# Patient Record
Sex: FEMALE | Race: WHITE | NOT HISPANIC OR LATINO | Employment: OTHER | URBAN - METROPOLITAN AREA
[De-identification: names, ages, dates, MRNs, and addresses within clinical notes are randomized per-mention and may not be internally consistent; named-entity substitution may affect disease eponyms.]

---

## 2017-02-28 ENCOUNTER — HOSPITAL ENCOUNTER (OUTPATIENT)
Dept: FAMILY MEDICINE CLINIC | Facility: CLINIC | Age: 42
Discharge: HOME | End: 2017-02-28
Attending: NURSE PRACTITIONER | Admitting: NURSE PRACTITIONER

## 2018-04-23 ENCOUNTER — HOSPITAL ENCOUNTER (OUTPATIENT)
Dept: FAMILY MEDICINE CLINIC | Facility: CLINIC | Age: 43
Discharge: HOME | End: 2018-04-23
Attending: NURSE PRACTITIONER | Admitting: NURSE PRACTITIONER

## 2019-07-03 RX ORDER — ALBUTEROL SULFATE 90 UG/1
2 AEROSOL, METERED RESPIRATORY (INHALATION) EVERY 4 HOURS PRN
Qty: 1 INHALER | Refills: 2 | Status: SHIPPED | OUTPATIENT
Start: 2019-07-03 | End: 2020-03-17 | Stop reason: SDUPTHER

## 2019-07-03 RX ORDER — ALBUTEROL SULFATE 90 UG/1
2 AEROSOL, METERED RESPIRATORY (INHALATION) EVERY 4 HOURS PRN
COMMUNITY
Start: 2016-11-04 | End: 2019-07-03 | Stop reason: SDUPTHER

## 2019-08-07 RX ORDER — LEVOTHYROXINE SODIUM 0.07 MG/1
TABLET ORAL
Qty: 30 TABLET | Refills: 3 | Status: SHIPPED | OUTPATIENT
Start: 2019-08-07 | End: 2019-11-29 | Stop reason: SDUPTHER

## 2019-09-26 ENCOUNTER — OFFICE VISIT (OUTPATIENT)
Dept: FAMILY MEDICINE CLINIC | Facility: CLINIC | Age: 44
End: 2019-09-26

## 2019-09-26 VITALS
TEMPERATURE: 98.3 F | WEIGHT: 115 LBS | HEART RATE: 75 BPM | BODY MASS INDEX: 23.18 KG/M2 | HEIGHT: 59 IN | DIASTOLIC BLOOD PRESSURE: 89 MMHG | SYSTOLIC BLOOD PRESSURE: 137 MMHG | OXYGEN SATURATION: 98 %

## 2019-09-26 DIAGNOSIS — E03.9 HYPOTHYROIDISM, UNSPECIFIED TYPE: Primary | ICD-10-CM

## 2019-09-26 DIAGNOSIS — M54.2 NECK PAIN: ICD-10-CM

## 2019-09-26 DIAGNOSIS — Z12.39 BREAST CANCER SCREENING: ICD-10-CM

## 2019-09-26 DIAGNOSIS — Z00.00 PREVENTATIVE HEALTH CARE: ICD-10-CM

## 2019-09-26 DIAGNOSIS — M54.12 CERVICAL RADICULOPATHY: ICD-10-CM

## 2019-09-26 PROBLEM — R76.8 ELEVATED ANTINUCLEAR ANTIBODY (ANA) LEVEL: Status: ACTIVE | Noted: 2017-05-18

## 2019-09-26 PROBLEM — M13.80 SERONEGATIVE ARTHRITIS: Status: ACTIVE | Noted: 2018-01-02

## 2019-09-26 PROBLEM — F32.A DEPRESSION: Status: ACTIVE | Noted: 2017-09-13

## 2019-09-26 PROBLEM — M54.9 BACK PAIN: Status: ACTIVE | Noted: 2018-04-23

## 2019-09-26 PROBLEM — M79.7 FIBROMYALGIA: Status: ACTIVE | Noted: 2018-01-02

## 2019-09-26 PROBLEM — F17.200 TOBACCO DEPENDENCE SYNDROME: Status: ACTIVE | Noted: 2017-02-28

## 2019-09-26 PROBLEM — J30.1 ALLERGIC RHINITIS DUE TO POLLEN: Status: ACTIVE | Noted: 2019-04-03

## 2019-09-26 PROCEDURE — 99213 OFFICE O/P EST LOW 20 MIN: CPT | Performed by: NURSE PRACTITIONER

## 2019-09-26 RX ORDER — METHYLPREDNISOLONE 4 MG/1
TABLET ORAL
Qty: 1 EACH | Refills: 0 | Status: SHIPPED | OUTPATIENT
Start: 2019-09-26 | End: 2020-03-17

## 2019-09-26 RX ORDER — NAPROXEN SODIUM 220 MG
220 TABLET ORAL 2 TIMES DAILY PRN
Qty: 20 TABLET | Refills: 0 | Status: SHIPPED | OUTPATIENT
Start: 2019-09-26 | End: 2020-03-17

## 2019-09-26 RX ORDER — BACLOFEN 10 MG/1
10 TABLET ORAL 3 TIMES DAILY
Qty: 30 TABLET | Refills: 0 | Status: SHIPPED | OUTPATIENT
Start: 2019-09-26 | End: 2020-09-15

## 2019-09-26 NOTE — PATIENT INSTRUCTIONS
Take medications as directed   physical therapy as directed   keep appointment for mammogram   keep all follow-up appointment with Podiatry

## 2019-09-26 NOTE — PROGRESS NOTES
Subjective   Michelle Sandhu is a 44 y.o. female.     43-year-old white female smoker with history of noncompliance with psychiatric issues, chronic back pain, depression, hypothyroidism, GERD, headaches, myalgias who comes in today with complaints of neck pain that radiates down to her back and down her left arm that started about a week ago.  She does have a history of stenosis at C6 and C7 with some bone spurring.  Placing her on medications and her therapy at that does not work will be given Ortho consult  Patient has seen podiatry since her last visit he did surgery on the foot with screw and she states he feels better but she still has some pain  Patient up-to-date on eye exams by me is good her mammogram  Blood pressure 136/88 heart rate 74 she denies any chest pain, dyspnea, tachycardia, dizziness or edema     physical therapy 3 x 2   mammogram   Medrol Dosepak/ baclofen 10 mg three times a day/ Aleve twice a day         The following portions of the patient's history were reviewed and updated as appropriate: allergies, current medications, past family history, past medical history, past social history, past surgical history and problem list.    Review of Systems   Constitutional: Negative.    HENT: Negative.    Respiratory: Negative.    Cardiovascular: Negative.    Gastrointestinal: Negative.    Genitourinary: Negative.    Musculoskeletal: Positive for neck pain.   Neurological: Negative.    Psychiatric/Behavioral: Negative.        Objective   Physical Exam   Constitutional: She is oriented to person, place, and time. She appears well-developed and well-nourished.   Cardiovascular: Normal rate.   Pulmonary/Chest: Effort normal and breath sounds normal.   Abdominal: Soft.   Musculoskeletal:    neck pain with what of clumpy in to left shoulder and arm   Neurological: She is alert and oriented to person, place, and time.   Skin: Skin is warm and dry.   Psychiatric: She has a normal mood and affect.          Assessment/Plan   Problems Addressed this Visit        Endocrine    Hypothyroidism - Primary    Relevant Medications    methylPREDNISolone (MEDROL, ANDREY,) 4 MG tablet    Other Relevant Orders    T3    TSH+Free T4       Nervous and Auditory    Cervical radiculopathy    Relevant Orders    Ambulatory Referral to Physical Therapy Evaluate and treat (Completed)    Neck pain    Relevant Orders    Ambulatory Referral to Physical Therapy Evaluate and treat (Completed)      Other Visit Diagnoses     Preventative health care        Relevant Orders    CBC (No Diff)    Comprehensive Metabolic Panel    Lipid Panel With LDL / HDL Ratio    Breast cancer screening        Relevant Orders    Mammo Screening Digital Tomosynthesis Bilateral With CAD

## 2019-09-28 LAB
ALBUMIN SERPL-MCNC: 4.3 G/DL (ref 3.5–5.5)
ALBUMIN/GLOB SERPL: 2 {RATIO} (ref 1.2–2.2)
ALP SERPL-CCNC: 56 IU/L (ref 39–117)
ALT SERPL-CCNC: 13 IU/L (ref 0–32)
AST SERPL-CCNC: 15 IU/L (ref 0–40)
BILIRUB SERPL-MCNC: 0.3 MG/DL (ref 0–1.2)
BUN SERPL-MCNC: 13 MG/DL (ref 6–24)
BUN/CREAT SERPL: 20 (ref 9–23)
CALCIUM SERPL-MCNC: 9.6 MG/DL (ref 8.7–10.2)
CHLORIDE SERPL-SCNC: 106 MMOL/L (ref 96–106)
CHOLEST SERPL-MCNC: 209 MG/DL (ref 100–199)
CO2 SERPL-SCNC: 24 MMOL/L (ref 20–29)
CREAT SERPL-MCNC: 0.64 MG/DL (ref 0.57–1)
ERYTHROCYTE [DISTWIDTH] IN BLOOD BY AUTOMATED COUNT: 13.3 % (ref 12.3–15.4)
GLOBULIN SER CALC-MCNC: 2.2 G/DL (ref 1.5–4.5)
GLUCOSE SERPL-MCNC: 87 MG/DL (ref 65–99)
HCT VFR BLD AUTO: 44.1 % (ref 34–46.6)
HDLC SERPL-MCNC: 52 MG/DL
HGB BLD-MCNC: 14.6 G/DL (ref 11.1–15.9)
LDLC SERPL CALC-MCNC: 141 MG/DL (ref 0–99)
LDLC/HDLC SERPL: 2.7 RATIO (ref 0–3.2)
MCH RBC QN AUTO: 33.3 PG (ref 26.6–33)
MCHC RBC AUTO-ENTMCNC: 33.1 G/DL (ref 31.5–35.7)
MCV RBC AUTO: 101 FL (ref 79–97)
PLATELET # BLD AUTO: 279 X10E3/UL (ref 150–450)
POTASSIUM SERPL-SCNC: 4.9 MMOL/L (ref 3.5–5.2)
PROT SERPL-MCNC: 6.5 G/DL (ref 6–8.5)
RBC # BLD AUTO: 4.39 X10E6/UL (ref 3.77–5.28)
SODIUM SERPL-SCNC: 143 MMOL/L (ref 134–144)
T3 SERPL-MCNC: 93 NG/DL (ref 71–180)
T4 FREE SERPL-MCNC: 1.35 NG/DL (ref 0.82–1.77)
TRIGL SERPL-MCNC: 78 MG/DL (ref 0–149)
TSH SERPL DL<=0.005 MIU/L-ACNC: 1.74 UIU/ML (ref 0.45–4.5)
VLDLC SERPL CALC-MCNC: 16 MG/DL (ref 5–40)
WBC # BLD AUTO: 8.1 X10E3/UL (ref 3.4–10.8)

## 2019-10-15 DIAGNOSIS — Z12.39 BREAST CANCER SCREENING: ICD-10-CM

## 2019-10-17 ENCOUNTER — TELEPHONE (OUTPATIENT)
Dept: FAMILY MEDICINE CLINIC | Facility: CLINIC | Age: 44
End: 2019-10-17

## 2019-10-17 DIAGNOSIS — M54.2 CERVICAL PAIN (NECK): ICD-10-CM

## 2019-10-17 DIAGNOSIS — M54.6 PAIN IN THORACIC SPINE: Primary | ICD-10-CM

## 2019-10-24 DIAGNOSIS — M54.2 CERVICAL PAIN (NECK): Primary | ICD-10-CM

## 2019-11-21 ENCOUNTER — OFFICE VISIT (OUTPATIENT)
Dept: NEUROSURGERY | Facility: CLINIC | Age: 44
End: 2019-11-21

## 2019-11-21 VITALS
BODY MASS INDEX: 23.16 KG/M2 | DIASTOLIC BLOOD PRESSURE: 79 MMHG | RESPIRATION RATE: 18 BRPM | HEIGHT: 60 IN | WEIGHT: 118 LBS | HEART RATE: 75 BPM | OXYGEN SATURATION: 99 % | SYSTOLIC BLOOD PRESSURE: 123 MMHG

## 2019-11-21 DIAGNOSIS — M54.50 CHRONIC BILATERAL LOW BACK PAIN WITHOUT SCIATICA: ICD-10-CM

## 2019-11-21 DIAGNOSIS — G62.9 NEUROPATHY: ICD-10-CM

## 2019-11-21 DIAGNOSIS — G89.29 CHRONIC BILATERAL LOW BACK PAIN WITHOUT SCIATICA: ICD-10-CM

## 2019-11-21 DIAGNOSIS — M13.80 SERONEGATIVE ARTHRITIS: Primary | ICD-10-CM

## 2019-11-21 PROCEDURE — 99203 OFFICE O/P NEW LOW 30 MIN: CPT | Performed by: NEUROLOGICAL SURGERY

## 2019-11-25 ENCOUNTER — TELEPHONE (OUTPATIENT)
Dept: NEUROSURGERY | Facility: CLINIC | Age: 44
End: 2019-11-25

## 2019-11-25 DIAGNOSIS — G89.29 CHRONIC BILATERAL LOW BACK PAIN, UNSPECIFIED WHETHER SCIATICA PRESENT: Primary | ICD-10-CM

## 2019-11-25 DIAGNOSIS — M54.50 CHRONIC BILATERAL LOW BACK PAIN, UNSPECIFIED WHETHER SCIATICA PRESENT: Primary | ICD-10-CM

## 2019-11-25 NOTE — TELEPHONE ENCOUNTER
Voicemail from patient stating she was to have had scripts sent to pharmacy has not received them yet.

## 2019-11-27 RX ORDER — MELOXICAM 15 MG/1
15 TABLET ORAL DAILY
Qty: 30 TABLET | Refills: 2 | OUTPATIENT
Start: 2019-11-27 | End: 2020-03-17

## 2019-12-02 RX ORDER — LEVOTHYROXINE SODIUM 0.07 MG/1
TABLET ORAL
Qty: 30 TABLET | Refills: 3 | Status: SHIPPED | OUTPATIENT
Start: 2019-12-02 | End: 2020-04-20

## 2020-03-17 ENCOUNTER — OFFICE VISIT (OUTPATIENT)
Dept: FAMILY MEDICINE CLINIC | Facility: CLINIC | Age: 45
End: 2020-03-17

## 2020-03-17 VITALS
DIASTOLIC BLOOD PRESSURE: 87 MMHG | OXYGEN SATURATION: 97 % | TEMPERATURE: 98.5 F | HEART RATE: 80 BPM | WEIGHT: 121.8 LBS | SYSTOLIC BLOOD PRESSURE: 125 MMHG | BODY MASS INDEX: 24.56 KG/M2 | HEIGHT: 59 IN

## 2020-03-17 DIAGNOSIS — J30.1 ALLERGIC RHINITIS DUE TO POLLEN, UNSPECIFIED SEASONALITY: Primary | ICD-10-CM

## 2020-03-17 DIAGNOSIS — F17.200 TOBACCO DEPENDENCE SYNDROME: ICD-10-CM

## 2020-03-17 PROCEDURE — 99213 OFFICE O/P EST LOW 20 MIN: CPT | Performed by: NURSE PRACTITIONER

## 2020-03-17 RX ORDER — CETIRIZINE HYDROCHLORIDE 10 MG/1
10 TABLET ORAL DAILY
Qty: 30 TABLET | Refills: 2 | Status: SHIPPED | OUTPATIENT
Start: 2020-03-17 | End: 2020-09-15

## 2020-03-17 RX ORDER — FLUTICASONE PROPIONATE 50 MCG
2 SPRAY, SUSPENSION (ML) NASAL DAILY
Qty: 1 BOTTLE | Refills: 6 | Status: SHIPPED | OUTPATIENT
Start: 2020-03-17 | End: 2020-09-28 | Stop reason: SDUPTHER

## 2020-03-17 RX ORDER — PSEUDOEPHEDRINE HCL 30 MG
30 TABLET ORAL EVERY 4 HOURS PRN
Qty: 60 TABLET | Refills: 2 | Status: SHIPPED | OUTPATIENT
Start: 2020-03-17 | End: 2020-09-15

## 2020-03-17 RX ORDER — ALBUTEROL SULFATE 90 UG/1
2 AEROSOL, METERED RESPIRATORY (INHALATION) EVERY 4 HOURS PRN
Qty: 1 INHALER | Refills: 5 | Status: SHIPPED | OUTPATIENT
Start: 2020-03-17 | End: 2020-12-04 | Stop reason: SDUPTHER

## 2020-03-17 NOTE — PROGRESS NOTES
"    Michelle Sandhu is a 45 y.o. female.     45-year-old white female smoker with history of noncompliance with psychiatric issues, chronic back pain, depression, hypothyroidism, GERD, headaches, and myalgias who comes in today for follow-up visit.  Patient is needing refill on all medications and states she is doing well with the exception of allergic rhinitis symptoms and congestion at night making it hard to sleep.  Exam reveals allergic rhinitis lungs are clear but diminished on placing her on allergy medication she is to call me if condition does not improve or worsens  Blood pressure 124/86 heart rate 82 denies any chest pain, dyspnea, tachycardia or dizziness  Weight is up 7 pounds at 122 but patient is not overweight patient coming in next visit fasting    Zyrtec 10 mg a.m./Flonase nasal spray/Sudafed 30 4 times daily/Benadryl 25 nightly  Fasting blood work next visit       The following portions of the patient's history were reviewed and updated as appropriate: allergies, current medications, past family history, past medical history, past social history, past surgical history and problem list.    Vitals:    03/17/20 1035   BP: 125/87   BP Location: Right arm   Patient Position: Sitting   Cuff Size: Adult   Pulse: 80   Temp: 98.5 °F (36.9 °C)   TempSrc: Oral   SpO2: 97%   Weight: 55.2 kg (121 lb 12.8 oz)   Height: 149.9 cm (59\")     Body mass index is 24.6 kg/m².    Past Medical History:   Diagnosis Date   • Fibromyalgia, primary    • Hypertension    • Hypothyroidism    • Rheumatoid arthritis (CMS/HCC)      Past Surgical History:   Procedure Laterality Date   • EYE SURGERY     • EYE SURGERY     • FOOT SURGERY     • INNER EAR SURGERY     • OVARY SURGERY     • SUBTOTAL HYSTERECTOMY     • TUMOR REMOVAL       Family History   Problem Relation Age of Onset   • Hypertension Mother    • COPD Mother    • Heart disease Mother    • Hyperlipidemia Mother    • Diabetes Maternal Uncle    • Cancer Maternal Uncle    • " Cancer Maternal Grandfather        There is no immunization history on file for this patient.    Office Visit on 09/26/2019   Component Date Value Ref Range Status   • WBC 09/27/2019 8.1  3.4 - 10.8 x10E3/uL Final   • RBC 09/27/2019 4.39  3.77 - 5.28 x10E6/uL Final   • Hemoglobin 09/27/2019 14.6  11.1 - 15.9 g/dL Final   • Hematocrit 09/27/2019 44.1  34.0 - 46.6 % Final   • MCV 09/27/2019 101* 79 - 97 fL Final   • MCH 09/27/2019 33.3* 26.6 - 33.0 pg Final   • MCHC 09/27/2019 33.1  31.5 - 35.7 g/dL Final   • RDW 09/27/2019 13.3  12.3 - 15.4 % Final   • Platelets 09/27/2019 279  150 - 450 x10E3/uL Final   • Glucose 09/27/2019 87  65 - 99 mg/dL Final   • BUN 09/27/2019 13  6 - 24 mg/dL Final   • Creatinine 09/27/2019 0.64  0.57 - 1.00 mg/dL Final   • eGFR Non African Am 09/27/2019 110  >59 mL/min/1.73 Final   • eGFR African Am 09/27/2019 126  >59 mL/min/1.73 Final   • BUN/Creatinine Ratio 09/27/2019 20  9 - 23 Final   • Sodium 09/27/2019 143  134 - 144 mmol/L Final   • Potassium 09/27/2019 4.9  3.5 - 5.2 mmol/L Final   • Chloride 09/27/2019 106  96 - 106 mmol/L Final   • Total CO2 09/27/2019 24  20 - 29 mmol/L Final   • Calcium 09/27/2019 9.6  8.7 - 10.2 mg/dL Final   • Total Protein 09/27/2019 6.5  6.0 - 8.5 g/dL Final   • Albumin 09/27/2019 4.3  3.5 - 5.5 g/dL Final   • Globulin 09/27/2019 2.2  1.5 - 4.5 g/dL Final   • A/G Ratio 09/27/2019 2.0  1.2 - 2.2 Final   • Total Bilirubin 09/27/2019 0.3  0.0 - 1.2 mg/dL Final   • Alkaline Phosphatase 09/27/2019 56  39 - 117 IU/L Final   • AST (SGOT) 09/27/2019 15  0 - 40 IU/L Final   • ALT (SGPT) 09/27/2019 13  0 - 32 IU/L Final   • T3, Total 09/27/2019 93  71 - 180 ng/dL Final   • TSH 09/27/2019 1.740  0.450 - 4.500 uIU/mL Final   • Free T4 09/27/2019 1.35  0.82 - 1.77 ng/dL Final   • Total Cholesterol 09/27/2019 209* 100 - 199 mg/dL Final   • Triglycerides 09/27/2019 78  0 - 149 mg/dL Final   • HDL Cholesterol 09/27/2019 52  >39 mg/dL Final   • VLDL Cholesterol 09/27/2019  16  5 - 40 mg/dL Final   • LDL Cholesterol  09/27/2019 141* 0 - 99 mg/dL Final   • LDL/HDL Ratio 09/27/2019 2.7  0.0 - 3.2 ratio Final    Comment:                                     LDL/HDL Ratio                                              Men  Women                                1/2 Avg.Risk  1.0    1.5                                    Avg.Risk  3.6    3.2                                 2X Avg.Risk  6.2    5.0                                 3X Avg.Risk  8.0    6.1           Review of Systems   Constitutional: Negative.    HENT: Positive for postnasal drip, rhinorrhea, sinus pressure, sore throat and voice change.    Respiratory: Positive for cough.    Cardiovascular: Negative.    Gastrointestinal: Negative.    Genitourinary: Negative.    Musculoskeletal: Negative.    Skin: Negative.    Neurological: Negative.    Psychiatric/Behavioral: Negative.        Objective   Physical Exam   Constitutional: She is oriented to person, place, and time. She appears well-developed and well-nourished.   Cardiovascular: Normal rate and regular rhythm.   Pulmonary/Chest: Effort normal.   Lungs diminished   Abdominal: Soft. Bowel sounds are normal.   Musculoskeletal: Normal range of motion.   Neurological: She is alert and oriented to person, place, and time.   Skin: Skin is warm and dry.   Psychiatric: She has a normal mood and affect.       Procedures    Assessment/Plan   Michelle was seen today for allergies.    Diagnoses and all orders for this visit:    Allergic rhinitis due to pollen, unspecified seasonality    Tobacco dependence syndrome    Other orders  -     albuterol sulfate HFA (ProAir HFA) 108 (90 Base) MCG/ACT inhaler; Inhale 2 puffs Every 4 (Four) Hours As Needed for Shortness of Air.  -     fluticasone (FLONASE) 50 MCG/ACT nasal spray; 2 sprays into the nostril(s) as directed by provider Daily.  -     cetirizine (ZyrTEC Allergy) 10 MG tablet; Take 1 tablet by mouth Daily.  -     pseudoephedrine (Sudafed) 30 MG tablet;  Take 1 tablet by mouth Every 4 (Four) Hours As Needed for Congestion.          Current Outpatient Medications:   •  albuterol sulfate HFA (ProAir HFA) 108 (90 Base) MCG/ACT inhaler, Inhale 2 puffs Every 4 (Four) Hours As Needed for Shortness of Air., Disp: 1 inhaler, Rfl: 5  •  levothyroxine (SYNTHROID, LEVOTHROID) 75 MCG tablet, take 1 tablet by mouth once daily ON AN EMPTY STOMACH, Disp: 30 tablet, Rfl: 3  •  baclofen (LIORESAL) 10 MG tablet, Take 1 tablet by mouth 3 (Three) Times a Day., Disp: 30 tablet, Rfl: 0  •  cetirizine (ZyrTEC Allergy) 10 MG tablet, Take 1 tablet by mouth Daily., Disp: 30 tablet, Rfl: 2  •  fluticasone (FLONASE) 50 MCG/ACT nasal spray, 2 sprays into the nostril(s) as directed by provider Daily., Disp: 1 bottle, Rfl: 6  •  pseudoephedrine (Sudafed) 30 MG tablet, Take 1 tablet by mouth Every 4 (Four) Hours As Needed for Congestion., Disp: 60 tablet, Rfl: 2

## 2020-03-17 NOTE — PATIENT INSTRUCTIONS
Take allergy medication as directed  Fasting blood work next visit  Stop smoking , consider smoking cessation

## 2020-04-20 RX ORDER — LEVOTHYROXINE SODIUM 0.07 MG/1
TABLET ORAL
Qty: 30 TABLET | Refills: 3 | Status: SHIPPED | OUTPATIENT
Start: 2020-04-20 | End: 2020-08-06

## 2020-07-14 ENCOUNTER — TELEPHONE (OUTPATIENT)
Dept: FAMILY MEDICINE CLINIC | Facility: CLINIC | Age: 45
End: 2020-07-14

## 2020-07-14 NOTE — TELEPHONE ENCOUNTER
PATIENT HAS RECEIVED NICOTINE GUM AND WANTS TO MAKE  SURE THAT IT IS SAFE FOR HER TO USE WITH HER MEDICATIONS

## 2020-07-23 RX ORDER — ALBUTEROL SULFATE 90 UG/1
AEROSOL, METERED RESPIRATORY (INHALATION)
Qty: 8.5 G | OUTPATIENT
Start: 2020-07-23

## 2020-08-06 RX ORDER — LEVOTHYROXINE SODIUM 0.07 MG/1
TABLET ORAL
Qty: 30 TABLET | Refills: 3 | Status: SHIPPED | OUTPATIENT
Start: 2020-08-06 | End: 2020-12-01 | Stop reason: SDUPTHER

## 2020-09-15 ENCOUNTER — OFFICE VISIT (OUTPATIENT)
Dept: FAMILY MEDICINE CLINIC | Facility: CLINIC | Age: 45
End: 2020-09-15

## 2020-09-15 VITALS
WEIGHT: 120 LBS | DIASTOLIC BLOOD PRESSURE: 87 MMHG | TEMPERATURE: 97.1 F | BODY MASS INDEX: 24.19 KG/M2 | SYSTOLIC BLOOD PRESSURE: 143 MMHG | HEART RATE: 78 BPM | OXYGEN SATURATION: 100 % | HEIGHT: 59 IN

## 2020-09-15 DIAGNOSIS — Z00.00 PREVENTATIVE HEALTH CARE: ICD-10-CM

## 2020-09-15 DIAGNOSIS — M79.675 CHRONIC TOE PAIN, LEFT FOOT: ICD-10-CM

## 2020-09-15 DIAGNOSIS — I10 BENIGN ESSENTIAL HYPERTENSION: ICD-10-CM

## 2020-09-15 DIAGNOSIS — F17.200 TOBACCO DEPENDENCE SYNDROME: ICD-10-CM

## 2020-09-15 DIAGNOSIS — G89.29 CHRONIC TOE PAIN, LEFT FOOT: ICD-10-CM

## 2020-09-15 DIAGNOSIS — E03.9 HYPOTHYROIDISM, UNSPECIFIED TYPE: Primary | ICD-10-CM

## 2020-09-15 PROCEDURE — 99213 OFFICE O/P EST LOW 20 MIN: CPT | Performed by: NURSE PRACTITIONER

## 2020-09-15 PROCEDURE — G0439 PPPS, SUBSEQ VISIT: HCPCS | Performed by: NURSE PRACTITIONER

## 2020-09-15 RX ORDER — AMLODIPINE BESYLATE 2.5 MG/1
2.5 TABLET ORAL
Qty: 30 TABLET | Refills: 2 | Status: SHIPPED | OUTPATIENT
Start: 2020-09-15 | End: 2020-12-10 | Stop reason: SDUPTHER

## 2020-09-15 NOTE — PATIENT INSTRUCTIONS
Schedule fasting blood work  Keep appointment with podiatry  Purchase nebulizer for COPD  Stop smoking , consider smoking cessation

## 2020-09-15 NOTE — PROGRESS NOTES
"    Michelle Sandhu is a 45 y.o. female.     45-year-old white female smoker with history of noncompliance with psychiatric issues, chronic back pain, depression, hypothyroidism, GERD, headaches, myalgias, and recent surgery on left little toe tendon with podiatry who comes in today for wellness visit  Blood pressure 148/86 heart rate 78 she denies any chest pain, dyspnea, tachycardia or dizziness  Her weight is stable at 120 with a BMI of 24.2  Patient is up-to-date on eye exams Pap smears and mammograms.  She refuses influenza vaccine  We discussed smoking cessation patient not interested at this time.  I did  recommend she get a nebulizer that would help her better with her breathing during the hot weather.  Patient had recent surgery on her left little toe tendon to straighten the toe out however she states is still having a lot of pain I am sending her to a different podiatrist for second opinion.    Fasting blood work  Reconsider flu vaccination  Pickup nebulizer  Podiatry referral         The following portions of the patient's history were reviewed and updated as appropriate: allergies, current medications, past family history, past medical history, past social history, past surgical history and problem list.    Vitals:    09/15/20 1444   BP: 143/87   BP Location: Right arm   Patient Position: Sitting   Cuff Size: Adult   Pulse: 78   Temp: 97.1 °F (36.2 °C)   TempSrc: Temporal   SpO2: 100%   Weight: 54.4 kg (120 lb)   Height: 149.9 cm (59\")     Body mass index is 24.24 kg/m².    Past Medical History:   Diagnosis Date   • Fibromyalgia, primary    • Hypertension    • Hypothyroidism    • Rheumatoid arthritis (CMS/HCC)      Past Surgical History:   Procedure Laterality Date   • EYE SURGERY     • EYE SURGERY     • FOOT SURGERY     • INNER EAR SURGERY     • OVARY SURGERY     • SUBTOTAL HYSTERECTOMY     • TUMOR REMOVAL       Family History   Problem Relation Age of Onset   • Hypertension Mother    • COPD Mother    • " Heart disease Mother    • Hyperlipidemia Mother    • Diabetes Maternal Uncle    • Cancer Maternal Uncle    • Cancer Maternal Grandfather        There is no immunization history on file for this patient.    Office Visit on 09/26/2019   Component Date Value Ref Range Status   • WBC 09/27/2019 8.1  3.4 - 10.8 x10E3/uL Final   • RBC 09/27/2019 4.39  3.77 - 5.28 x10E6/uL Final   • Hemoglobin 09/27/2019 14.6  11.1 - 15.9 g/dL Final   • Hematocrit 09/27/2019 44.1  34.0 - 46.6 % Final   • MCV 09/27/2019 101* 79 - 97 fL Final   • MCH 09/27/2019 33.3* 26.6 - 33.0 pg Final   • MCHC 09/27/2019 33.1  31.5 - 35.7 g/dL Final   • RDW 09/27/2019 13.3  12.3 - 15.4 % Final   • Platelets 09/27/2019 279  150 - 450 x10E3/uL Final   • Glucose 09/27/2019 87  65 - 99 mg/dL Final   • BUN 09/27/2019 13  6 - 24 mg/dL Final   • Creatinine 09/27/2019 0.64  0.57 - 1.00 mg/dL Final   • eGFR Non African Am 09/27/2019 110  >59 mL/min/1.73 Final   • eGFR African Am 09/27/2019 126  >59 mL/min/1.73 Final   • BUN/Creatinine Ratio 09/27/2019 20  9 - 23 Final   • Sodium 09/27/2019 143  134 - 144 mmol/L Final   • Potassium 09/27/2019 4.9  3.5 - 5.2 mmol/L Final   • Chloride 09/27/2019 106  96 - 106 mmol/L Final   • Total CO2 09/27/2019 24  20 - 29 mmol/L Final   • Calcium 09/27/2019 9.6  8.7 - 10.2 mg/dL Final   • Total Protein 09/27/2019 6.5  6.0 - 8.5 g/dL Final   • Albumin 09/27/2019 4.3  3.5 - 5.5 g/dL Final   • Globulin 09/27/2019 2.2  1.5 - 4.5 g/dL Final   • A/G Ratio 09/27/2019 2.0  1.2 - 2.2 Final   • Total Bilirubin 09/27/2019 0.3  0.0 - 1.2 mg/dL Final   • Alkaline Phosphatase 09/27/2019 56  39 - 117 IU/L Final   • AST (SGOT) 09/27/2019 15  0 - 40 IU/L Final   • ALT (SGPT) 09/27/2019 13  0 - 32 IU/L Final   • T3, Total 09/27/2019 93  71 - 180 ng/dL Final   • TSH 09/27/2019 1.740  0.450 - 4.500 uIU/mL Final   • Free T4 09/27/2019 1.35  0.82 - 1.77 ng/dL Final   • Total Cholesterol 09/27/2019 209* 100 - 199 mg/dL Final   • Triglycerides 09/27/2019  78  0 - 149 mg/dL Final   • HDL Cholesterol 09/27/2019 52  >39 mg/dL Final   • VLDL Cholesterol 09/27/2019 16  5 - 40 mg/dL Final   • LDL Cholesterol  09/27/2019 141* 0 - 99 mg/dL Final   • LDL/HDL Ratio 09/27/2019 2.7  0.0 - 3.2 ratio Final    Comment:                                     LDL/HDL Ratio                                              Men  Women                                1/2 Avg.Risk  1.0    1.5                                    Avg.Risk  3.6    3.2                                 2X Avg.Risk  6.2    5.0                                 3X Avg.Risk  8.0    6.1           Review of Systems   HENT: Negative.    Respiratory: Negative.    Cardiovascular: Negative.    Gastrointestinal: Negative.    Genitourinary: Negative.    Musculoskeletal:        Left little toe pain   Skin: Negative.    Neurological: Negative.    Psychiatric/Behavioral: Negative.        Objective   Physical Exam  Constitutional:       Appearance: Normal appearance.   Neck:      Musculoskeletal: Normal range of motion.   Cardiovascular:      Rate and Rhythm: Normal rate and regular rhythm.      Pulses: Normal pulses.      Heart sounds: Normal heart sounds.   Pulmonary:      Effort: Pulmonary effort is normal.      Breath sounds: Normal breath sounds.   Abdominal:      General: Bowel sounds are normal.   Musculoskeletal: Normal range of motion.   Skin:     General: Skin is warm and dry.   Neurological:      General: No focal deficit present.      Mental Status: She is alert and oriented to person, place, and time.   Psychiatric:         Mood and Affect: Mood normal.         Behavior: Behavior normal.         Procedures    Assessment/Plan   Michelle was seen today for medicare wellness-subsequent.    Diagnoses and all orders for this visit:    Hypothyroidism, unspecified type  -     TSH+Free T4  -     T3    Preventative health care  -     CBC & Differential  -     Comprehensive Metabolic Panel  -     Lipid Panel With LDL / HDL Ratio    Benign  essential hypertension    Tobacco dependence syndrome    BMI 24.0-24.9, adult    Chronic toe pain, left foot    Other orders  -     amLODIPine (NORVASC) 2.5 MG tablet; Take 1 tablet by mouth every night at bedtime.          Current Outpatient Medications:   •  albuterol sulfate HFA (ProAir HFA) 108 (90 Base) MCG/ACT inhaler, Inhale 2 puffs Every 4 (Four) Hours As Needed for Shortness of Air., Disp: 1 inhaler, Rfl: 5  •  fluticasone (FLONASE) 50 MCG/ACT nasal spray, 2 sprays into the nostril(s) as directed by provider Daily., Disp: 1 bottle, Rfl: 6  •  levothyroxine (SYNTHROID, LEVOTHROID) 75 MCG tablet, TAKE 1 TABLET BY MOUTH EVERY DAY ON EMPTY STOMACH, Disp: 30 tablet, Rfl: 3  •  amLODIPine (NORVASC) 2.5 MG tablet, Take 1 tablet by mouth every night at bedtime., Disp: 30 tablet, Rfl: 2

## 2020-09-23 LAB
ALBUMIN SERPL-MCNC: 4.3 G/DL (ref 3.8–4.8)
ALBUMIN/GLOB SERPL: 1.8 {RATIO} (ref 1.2–2.2)
ALP SERPL-CCNC: 64 IU/L (ref 39–117)
ALT SERPL-CCNC: 13 IU/L (ref 0–32)
AST SERPL-CCNC: 18 IU/L (ref 0–40)
BASOPHILS # BLD AUTO: 0.1 X10E3/UL (ref 0–0.2)
BASOPHILS NFR BLD AUTO: 1 %
BILIRUB SERPL-MCNC: 0.6 MG/DL (ref 0–1.2)
BUN SERPL-MCNC: 9 MG/DL (ref 6–24)
BUN/CREAT SERPL: 11 (ref 9–23)
CALCIUM SERPL-MCNC: 9.8 MG/DL (ref 8.7–10.2)
CHLORIDE SERPL-SCNC: 106 MMOL/L (ref 96–106)
CHOLEST SERPL-MCNC: 205 MG/DL (ref 100–199)
CO2 SERPL-SCNC: 25 MMOL/L (ref 20–29)
CREAT SERPL-MCNC: 0.81 MG/DL (ref 0.57–1)
EOSINOPHIL # BLD AUTO: 0.9 X10E3/UL (ref 0–0.4)
EOSINOPHIL NFR BLD AUTO: 14 %
ERYTHROCYTE [DISTWIDTH] IN BLOOD BY AUTOMATED COUNT: 12.3 % (ref 11.7–15.4)
GLOBULIN SER CALC-MCNC: 2.4 G/DL (ref 1.5–4.5)
GLUCOSE SERPL-MCNC: 89 MG/DL (ref 65–99)
HCT VFR BLD AUTO: 45.4 % (ref 34–46.6)
HDLC SERPL-MCNC: 58 MG/DL
HGB BLD-MCNC: 15.2 G/DL (ref 11.1–15.9)
IMM GRANULOCYTES # BLD AUTO: 0 X10E3/UL (ref 0–0.1)
IMM GRANULOCYTES NFR BLD AUTO: 0 %
LDLC SERPL CALC-MCNC: 135 MG/DL (ref 0–99)
LDLC/HDLC SERPL: 2.3 RATIO (ref 0–3.2)
LYMPHOCYTES # BLD AUTO: 1.6 X10E3/UL (ref 0.7–3.1)
LYMPHOCYTES NFR BLD AUTO: 25 %
MCH RBC QN AUTO: 33.6 PG (ref 26.6–33)
MCHC RBC AUTO-ENTMCNC: 33.5 G/DL (ref 31.5–35.7)
MCV RBC AUTO: 100 FL (ref 79–97)
MONOCYTES # BLD AUTO: 0.6 X10E3/UL (ref 0.1–0.9)
MONOCYTES NFR BLD AUTO: 10 %
NEUTROPHILS # BLD AUTO: 3.2 X10E3/UL (ref 1.4–7)
NEUTROPHILS NFR BLD AUTO: 50 %
PLATELET # BLD AUTO: 269 X10E3/UL (ref 150–450)
POTASSIUM SERPL-SCNC: 5.1 MMOL/L (ref 3.5–5.2)
PROT SERPL-MCNC: 6.7 G/DL (ref 6–8.5)
RBC # BLD AUTO: 4.52 X10E6/UL (ref 3.77–5.28)
SODIUM SERPL-SCNC: 142 MMOL/L (ref 134–144)
T3 SERPL-MCNC: 90 NG/DL (ref 71–180)
T4 FREE SERPL-MCNC: 1.51 NG/DL (ref 0.82–1.77)
TRIGL SERPL-MCNC: 67 MG/DL (ref 0–149)
TSH SERPL DL<=0.005 MIU/L-ACNC: 1.5 UIU/ML (ref 0.45–4.5)
VLDLC SERPL CALC-MCNC: 12 MG/DL (ref 5–40)
WBC # BLD AUTO: 6.4 X10E3/UL (ref 3.4–10.8)

## 2020-09-28 RX ORDER — FLUTICASONE PROPIONATE 50 MCG
2 SPRAY, SUSPENSION (ML) NASAL DAILY
Qty: 1 BOTTLE | Refills: 6 | Status: SHIPPED | OUTPATIENT
Start: 2020-09-28 | End: 2020-11-16

## 2020-11-16 ENCOUNTER — OFFICE VISIT (OUTPATIENT)
Dept: FAMILY MEDICINE CLINIC | Facility: CLINIC | Age: 45
End: 2020-11-16

## 2020-11-16 VITALS
BODY MASS INDEX: 24.8 KG/M2 | WEIGHT: 123 LBS | HEIGHT: 59 IN | DIASTOLIC BLOOD PRESSURE: 92 MMHG | SYSTOLIC BLOOD PRESSURE: 150 MMHG | OXYGEN SATURATION: 97 % | HEART RATE: 83 BPM | TEMPERATURE: 98.9 F

## 2020-11-16 DIAGNOSIS — E03.9 HYPOTHYROIDISM, UNSPECIFIED TYPE: Primary | ICD-10-CM

## 2020-11-16 DIAGNOSIS — R07.9 CHEST PAIN, UNSPECIFIED TYPE: ICD-10-CM

## 2020-11-16 DIAGNOSIS — R00.2 PALPITATIONS: ICD-10-CM

## 2020-11-16 DIAGNOSIS — I10 BENIGN ESSENTIAL HYPERTENSION: ICD-10-CM

## 2020-11-16 DIAGNOSIS — F17.200 TOBACCO DEPENDENCE SYNDROME: ICD-10-CM

## 2020-11-16 PROCEDURE — 93000 ELECTROCARDIOGRAM COMPLETE: CPT | Performed by: NURSE PRACTITIONER

## 2020-11-16 PROCEDURE — 99214 OFFICE O/P EST MOD 30 MIN: CPT | Performed by: NURSE PRACTITIONER

## 2020-11-16 RX ORDER — METOPROLOL SUCCINATE 25 MG/1
25 TABLET, EXTENDED RELEASE ORAL DAILY
Qty: 30 TABLET | Refills: 2 | Status: SHIPPED | OUTPATIENT
Start: 2020-11-16 | End: 2020-12-07

## 2020-11-16 RX ORDER — CETIRIZINE HYDROCHLORIDE 10 MG/1
10 TABLET ORAL DAILY
COMMUNITY
Start: 2020-10-28 | End: 2020-12-02 | Stop reason: SDUPTHER

## 2020-11-16 NOTE — PROGRESS NOTES
"    Michelle Sandhu is a 45 y.o. female.     45-year-old white female smoker with history of psychiatric issues, chronic back pain, depression, hypothyroidism, GERD, headaches, myalgias and recent surgery on left toe tendon who comes in today for follow-up visit  Blood pressure 150/92 heart rate 82 with murmur and patient states her palpitations have gotten a lot worse and are lasting 20 to 30 minutes.  She admits to drinking excessive amounts of caffeine plus smoking.  I am placing her on metoprolol 25 daily and stopping her amlodipine.  Patient is going to try to cut her caffeine in half.  I am setting her up with a stress test with Dr. Billingsley and do an EKG today.  Patient's mother has had a stroke and a stent to what daughter calls  maker  Weight is 123 with a BMI of 24.9     EKG   referral Dr. Billingsley/stress test  Metoprolol succinate 25 daily   stop amlodipine  Decrease caffeine intake monitor blood pressures closely                   The following portions of the patient's history were reviewed and updated as appropriate: allergies, current medications, past family history, past medical history, past social history, past surgical history and problem list.    Vitals:    11/16/20 1244   BP: 150/92   BP Location: Right arm   Patient Position: Sitting   Cuff Size: Adult   Pulse: 83   Temp: 98.9 °F (37.2 °C)   TempSrc: Temporal   SpO2: 97%   Weight: 55.8 kg (123 lb)   Height: 149.9 cm (59\")     Body mass index is 24.84 kg/m².    Past Medical History:   Diagnosis Date   • Fibromyalgia, primary    • Hypertension    • Hypothyroidism    • Rheumatoid arthritis (CMS/HCC)      Past Surgical History:   Procedure Laterality Date   • EYE SURGERY     • EYE SURGERY     • FOOT SURGERY     • INNER EAR SURGERY     • OVARY SURGERY     • SUBTOTAL HYSTERECTOMY     • TUMOR REMOVAL       Family History   Problem Relation Age of Onset   • Hypertension Mother    • COPD Mother    • Heart disease Mother    • Hyperlipidemia Mother    • " Diabetes Maternal Uncle    • Cancer Maternal Uncle    • Cancer Maternal Grandfather        There is no immunization history on file for this patient.    Office Visit on 09/15/2020   Component Date Value Ref Range Status   • WBC 09/22/2020 6.4  3.4 - 10.8 x10E3/uL Final   • RBC 09/22/2020 4.52  3.77 - 5.28 x10E6/uL Final   • Hemoglobin 09/22/2020 15.2  11.1 - 15.9 g/dL Final   • Hematocrit 09/22/2020 45.4  34.0 - 46.6 % Final   • MCV 09/22/2020 100* 79 - 97 fL Final   • MCH 09/22/2020 33.6* 26.6 - 33.0 pg Final   • MCHC 09/22/2020 33.5  31.5 - 35.7 g/dL Final   • RDW 09/22/2020 12.3  11.7 - 15.4 % Final   • Platelets 09/22/2020 269  150 - 450 x10E3/uL Final   • Neutrophil Rel % 09/22/2020 50  Not Estab. % Final   • Lymphocyte Rel % 09/22/2020 25  Not Estab. % Final   • Monocyte Rel % 09/22/2020 10  Not Estab. % Final   • Eosinophil Rel % 09/22/2020 14  Not Estab. % Final   • Basophil Rel % 09/22/2020 1  Not Estab. % Final   • Neutrophils Absolute 09/22/2020 3.2  1.4 - 7.0 x10E3/uL Final   • Lymphocytes Absolute 09/22/2020 1.6  0.7 - 3.1 x10E3/uL Final   • Monocytes Absolute 09/22/2020 0.6  0.1 - 0.9 x10E3/uL Final   • Eosinophils Absolute 09/22/2020 0.9* 0.0 - 0.4 x10E3/uL Final   • Basophils Absolute 09/22/2020 0.1  0.0 - 0.2 x10E3/uL Final   • Immature Granulocyte Rel % 09/22/2020 0  Not Estab. % Final   • Immature Grans Absolute 09/22/2020 0.0  0.0 - 0.1 x10E3/uL Final   • Glucose 09/22/2020 89  65 - 99 mg/dL Final   • BUN 09/22/2020 9  6 - 24 mg/dL Final   • Creatinine 09/22/2020 0.81  0.57 - 1.00 mg/dL Final   • eGFR Non  Am 09/22/2020 89  >59 mL/min/1.73 Final   • eGFR African Am 09/22/2020 102  >59 mL/min/1.73 Final   • BUN/Creatinine Ratio 09/22/2020 11  9 - 23 Final   • Sodium 09/22/2020 142  134 - 144 mmol/L Final   • Potassium 09/22/2020 5.1  3.5 - 5.2 mmol/L Final   • Chloride 09/22/2020 106  96 - 106 mmol/L Final   • Total CO2 09/22/2020 25  20 - 29 mmol/L Final   • Calcium 09/22/2020 9.8  8.7 -  10.2 mg/dL Final   • Total Protein 09/22/2020 6.7  6.0 - 8.5 g/dL Final   • Albumin 09/22/2020 4.3  3.8 - 4.8 g/dL Final   • Globulin 09/22/2020 2.4  1.5 - 4.5 g/dL Final   • A/G Ratio 09/22/2020 1.8  1.2 - 2.2 Final   • Total Bilirubin 09/22/2020 0.6  0.0 - 1.2 mg/dL Final   • Alkaline Phosphatase 09/22/2020 64  39 - 117 IU/L Final   • AST (SGOT) 09/22/2020 18  0 - 40 IU/L Final   • ALT (SGPT) 09/22/2020 13  0 - 32 IU/L Final   • Total Cholesterol 09/22/2020 205* 100 - 199 mg/dL Final   • Triglycerides 09/22/2020 67  0 - 149 mg/dL Final   • HDL Cholesterol 09/22/2020 58  >39 mg/dL Final   • VLDL Cholesterol Mau 09/22/2020 12  5 - 40 mg/dL Final   • LDL Chol Calc (NIH) 09/22/2020 135* 0 - 99 mg/dL Final   • LDL/HDL RATIO 09/22/2020 2.3  0.0 - 3.2 ratio Final    Comment:                                     LDL/HDL Ratio                                              Men  Women                                1/2 Avg.Risk  1.0    1.5                                    Avg.Risk  3.6    3.2                                 2X Avg.Risk  6.2    5.0                                 3X Avg.Risk  8.0    6.1     • TSH 09/22/2020 1.500  0.450 - 4.500 uIU/mL Final   • Free T4 09/22/2020 1.51  0.82 - 1.77 ng/dL Final   • T3, Total 09/22/2020 90  71 - 180 ng/dL Final         Review of Systems   Constitutional: Positive for fatigue.   HENT: Negative.    Respiratory: Negative.    Cardiovascular: Positive for chest pain and palpitations.   Genitourinary: Negative.    Musculoskeletal: Negative.    Skin: Negative.    Neurological: Negative.    Psychiatric/Behavioral: Negative.        Objective   Physical Exam  Constitutional:       Appearance: Normal appearance.   Neck:      Musculoskeletal: Normal range of motion.   Cardiovascular:      Rate and Rhythm: Normal rate and regular rhythm.      Pulses: Normal pulses.      Heart sounds: Normal heart sounds.      Comments: Patient having palpitations more frequently that are lasting longer  accompanied with chest pain  Pulmonary:      Effort: Pulmonary effort is normal.      Breath sounds: Normal breath sounds.   Abdominal:      General: Bowel sounds are normal.   Musculoskeletal: Normal range of motion.   Skin:     General: Skin is warm and dry.   Neurological:      General: No focal deficit present.      Mental Status: She is alert and oriented to person, place, and time.   Psychiatric:         Mood and Affect: Mood normal.           ECG 12 Lead    Date/Time: 11/17/2020 7:54 AM  Performed by: Evon Luke APRN  Authorized by: Evon Luke APRN   Comparison: not compared with previous ECG   Rhythm: sinus rhythm  Rate: normal  Conduction: conduction normal  ST Segments: ST segments normal  T Waves: T waves normal  QRS axis: normal  Other: no other findings    Clinical impression: normal ECG            Assessment/Plan   Problems Addressed this Visit        Cardiovascular and Mediastinum    Benign essential hypertension    Relevant Medications    metoprolol succinate XL (Toprol XL) 25 MG 24 hr tablet       Endocrine    Hypothyroidism - Primary    Relevant Medications    metoprolol succinate XL (Toprol XL) 25 MG 24 hr tablet    Other Relevant Orders    T3    TSH+Free T4       Other    Tobacco dependence syndrome      Other Visit Diagnoses     Palpitations        Relevant Orders    Comprehensive Metabolic Panel    Chest pain, unspecified type        BMI 24.0-24.9, adult          Diagnoses       Codes Comments    Hypothyroidism, unspecified type    -  Primary ICD-10-CM: E03.9  ICD-9-CM: 244.9     Palpitations     ICD-10-CM: R00.2  ICD-9-CM: 785.1     Benign essential hypertension     ICD-10-CM: I10  ICD-9-CM: 401.1     Tobacco dependence syndrome     ICD-10-CM: F17.200  ICD-9-CM: 305.1     Chest pain, unspecified type     ICD-10-CM: R07.9  ICD-9-CM: 786.50     BMI 24.0-24.9, adult     ICD-10-CM: Z68.24  ICD-9-CM: V85.1             Current Outpatient Medications:   •  albuterol sulfate HFA (ProAir  HFA) 108 (90 Base) MCG/ACT inhaler, Inhale 2 puffs Every 4 (Four) Hours As Needed for Shortness of Air., Disp: 1 inhaler, Rfl: 5  •  amLODIPine (NORVASC) 2.5 MG tablet, Take 1 tablet by mouth every night at bedtime., Disp: 30 tablet, Rfl: 2  •  cetirizine (zyrTEC) 10 MG tablet, Take 10 mg by mouth Daily., Disp: , Rfl:   •  levothyroxine (SYNTHROID, LEVOTHROID) 75 MCG tablet, TAKE 1 TABLET BY MOUTH EVERY DAY ON EMPTY STOMACH, Disp: 30 tablet, Rfl: 3  •  metoprolol succinate XL (Toprol XL) 25 MG 24 hr tablet, Take 1 tablet by mouth Daily., Disp: 30 tablet, Rfl: 2

## 2020-11-16 NOTE — PATIENT INSTRUCTIONS
Blood work today  Keep appointment with Dr. Billingsley and for stress test  Metoprolol as directed monitor blood pressure closely  Hold amlodipine  Decrease caffeine intake  Follow-up 3 months

## 2020-11-17 LAB
ALBUMIN SERPL-MCNC: 4.3 G/DL (ref 3.8–4.8)
ALBUMIN/GLOB SERPL: 1.7 {RATIO} (ref 1.2–2.2)
ALP SERPL-CCNC: 66 IU/L (ref 39–117)
ALT SERPL-CCNC: 16 IU/L (ref 0–32)
AST SERPL-CCNC: 18 IU/L (ref 0–40)
BILIRUB SERPL-MCNC: 0.3 MG/DL (ref 0–1.2)
BUN SERPL-MCNC: 9 MG/DL (ref 6–24)
BUN/CREAT SERPL: 12 (ref 9–23)
CALCIUM SERPL-MCNC: 9.4 MG/DL (ref 8.7–10.2)
CHLORIDE SERPL-SCNC: 103 MMOL/L (ref 96–106)
CO2 SERPL-SCNC: 24 MMOL/L (ref 20–29)
CREAT SERPL-MCNC: 0.76 MG/DL (ref 0.57–1)
GLOBULIN SER CALC-MCNC: 2.5 G/DL (ref 1.5–4.5)
GLUCOSE SERPL-MCNC: 123 MG/DL (ref 65–99)
POTASSIUM SERPL-SCNC: 3.7 MMOL/L (ref 3.5–5.2)
PROT SERPL-MCNC: 6.8 G/DL (ref 6–8.5)
SODIUM SERPL-SCNC: 140 MMOL/L (ref 134–144)
T3 SERPL-MCNC: 74 NG/DL (ref 71–180)
T4 FREE SERPL-MCNC: 1.41 NG/DL (ref 0.82–1.77)
TSH SERPL DL<=0.005 MIU/L-ACNC: 1.46 UIU/ML (ref 0.45–4.5)

## 2020-12-01 RX ORDER — LEVOTHYROXINE SODIUM 0.07 MG/1
75 TABLET ORAL DAILY
Qty: 30 TABLET | Refills: 3 | Status: SHIPPED | OUTPATIENT
Start: 2020-12-01 | End: 2021-03-29

## 2020-12-02 RX ORDER — CETIRIZINE HYDROCHLORIDE 10 MG/1
10 TABLET ORAL DAILY
Qty: 90 TABLET | Refills: 1 | Status: SHIPPED | OUTPATIENT
Start: 2020-12-02 | End: 2020-12-07

## 2020-12-05 RX ORDER — ALBUTEROL SULFATE 90 UG/1
2 AEROSOL, METERED RESPIRATORY (INHALATION) EVERY 4 HOURS PRN
Qty: 19 G | Refills: 11 | Status: SHIPPED | OUTPATIENT
Start: 2020-12-05 | End: 2022-06-07 | Stop reason: SDUPTHER

## 2020-12-07 ENCOUNTER — OFFICE VISIT (OUTPATIENT)
Dept: CARDIOLOGY | Facility: CLINIC | Age: 45
End: 2020-12-07

## 2020-12-07 VITALS
SYSTOLIC BLOOD PRESSURE: 129 MMHG | DIASTOLIC BLOOD PRESSURE: 77 MMHG | HEIGHT: 59 IN | BODY MASS INDEX: 25 KG/M2 | HEART RATE: 75 BPM | WEIGHT: 124 LBS | OXYGEN SATURATION: 100 %

## 2020-12-07 DIAGNOSIS — R07.89 OTHER CHEST PAIN: ICD-10-CM

## 2020-12-07 DIAGNOSIS — R06.02 SHORTNESS OF BREATH: ICD-10-CM

## 2020-12-07 DIAGNOSIS — I10 BENIGN ESSENTIAL HYPERTENSION: Primary | ICD-10-CM

## 2020-12-07 DIAGNOSIS — R00.2 PALPITATIONS: ICD-10-CM

## 2020-12-07 PROCEDURE — 99204 OFFICE O/P NEW MOD 45 MIN: CPT | Performed by: INTERNAL MEDICINE

## 2020-12-07 NOTE — PROGRESS NOTES
Date of Office Visit: 2020  Encounter Provider: Dr. Arsalan Billingsley    Place of Service: [facility] CARDIOLOG[facility] [place]  Patient Name: Michelle Sandhu  :1975  Evon Luke APRN    Chief Complaint   Patient presents with   • Consult   • Hyperlipidemia   • Palpitations   • Chest Pain     History of Present Illness:    I am pleased to see Mr. Cyr in my office today as a new consultation.    As you know, patient is 45-year-old white female whose past medical history significant for hypertension, asthma, who is referred to me for symptom of palpitation and shortness of breath and chest discomfort.    Patient reports that from last 2 months, she is having episode of palpitation described as a racing of the heart.  They last for 10 to 15 to 20 minutes.  It is described as a racing and associated with dizziness and lightheadedness.  Patient also complained of chest pressure at that time.  She has not passed out.  Patient complain of shortness of breath on exertion.  Occasional chest pressure is reported.  Patient denies any PND or orthopnea.    Patient does not have previous history of CAD, PCI or MI.  Patient does not abuse alcohol.  She smokes half a pack of cigarettes per day.    EKG done in my office shows sinus rhythm.  No ST changes.    Patient is having symptom of palpitation and also chest pain and shortness of breath.  I would recommend to proceed with Holter monitor.  Echocardiogram would be done to assess the left ventricle function.  Patient would undergo treadmill exercise stress test.  I will see the patient after these test.  Patient is advised to come to the hospital if she has persistent palpitation lasting long enough.  Fall precaution discussed.        Past Medical History:   Diagnosis Date   • Fibromyalgia, primary    • Hypertension    • Hypothyroidism    • Rheumatoid arthritis (CMS/HCC)          Past Surgical History:   Procedure Laterality Date   • EYE SURGERY     • EYE SURGERY      • FOOT SURGERY     • INNER EAR SURGERY     • OVARY SURGERY     • SUBTOTAL HYSTERECTOMY     • TUMOR REMOVAL             Current Outpatient Medications:   •  albuterol sulfate HFA (ProAir HFA) 108 (90 Base) MCG/ACT inhaler, Inhale 2 puffs Every 4 (Four) Hours As Needed for Shortness of Air., Disp: 19 g, Rfl: 11  •  amLODIPine (NORVASC) 2.5 MG tablet, Take 1 tablet by mouth every night at bedtime., Disp: 30 tablet, Rfl: 2  •  levothyroxine (SYNTHROID, LEVOTHROID) 75 MCG tablet, Take 1 tablet by mouth Daily. on an empty stomach, Disp: 30 tablet, Rfl: 3      Social History     Socioeconomic History   • Marital status:      Spouse name: Not on file   • Number of children: Not on file   • Years of education: Not on file   • Highest education level: Not on file   Tobacco Use   • Smoking status: Current Every Day Smoker     Packs/day: 1.00     Types: Cigarettes   • Smokeless tobacco: Never Used   Substance and Sexual Activity   • Alcohol use: No     Frequency: Never   • Drug use: No   • Sexual activity: Defer         Review of Systems   Constitution: Negative for chills and fever.   HENT: Negative for ear discharge and nosebleeds.    Eyes: Negative for discharge and redness.   Cardiovascular: Positive for chest pain and palpitations. Negative for orthopnea, paroxysmal nocturnal dyspnea and syncope.   Respiratory: Positive for shortness of breath. Negative for cough and wheezing.    Endocrine: Negative for heat intolerance.   Skin: Negative for rash.   Musculoskeletal: Negative for arthritis and myalgias.   Gastrointestinal: Negative for abdominal pain, melena, nausea and vomiting.   Genitourinary: Negative for dysuria and hematuria.   Neurological: Negative for dizziness, light-headedness, numbness and tremors.   Psychiatric/Behavioral: Negative for depression. The patient is not nervous/anxious.        Procedures    Procedures    No orders to display           Objective:    /77   Pulse 75   Ht 149.9 cm  "(59.02\")   Wt 56.2 kg (124 lb)   LMP  (LMP Unknown)   SpO2 100%   BMI 25.03 kg/m²         Constitutional:       Appearance: Well-developed.   Eyes:      General: No scleral icterus.        Right eye: No discharge.   HENT:      Head: Normocephalic and atraumatic.   Neck:      Thyroid: No thyromegaly.      Lymphadenopathy: No cervical adenopathy.   Pulmonary:      Effort: Pulmonary effort is normal. No respiratory distress.      Breath sounds: Normal breath sounds. No wheezing. No rales.   Cardiovascular:      Normal rate. Regular rhythm.      No gallop.   Abdominal:      Tenderness: There is no abdominal tenderness.   Skin:     Findings: No erythema or rash.   Neurological:      Mental Status: Alert and oriented to person, place, and time.             Assessment:       Diagnosis Plan   1. Benign essential hypertension     2. Other chest pain     3. Palpitations     4. Shortness of breath              Plan:       I would recommend to proceed with echocardiogram and Holter monitor and stress test.  "

## 2020-12-10 RX ORDER — AMLODIPINE BESYLATE 2.5 MG/1
2.5 TABLET ORAL
Qty: 30 TABLET | Refills: 2 | Status: SHIPPED | OUTPATIENT
Start: 2020-12-10 | End: 2021-03-01 | Stop reason: SDUPTHER

## 2021-01-15 ENCOUNTER — TELEPHONE (OUTPATIENT)
Dept: CARDIOLOGY | Facility: CLINIC | Age: 46
End: 2021-01-15

## 2021-01-15 NOTE — TELEPHONE ENCOUNTER
Patient called and stated that she received a text to call Dr. Billingsley about her test results. I looked in her chart and couldn't find a note where she was notified.

## 2021-01-18 NOTE — TELEPHONE ENCOUNTER
Left message that I can not find any one whom has called her. Hopefully whom ever it was will call her back. If there is anything else she needs to call us back.

## 2021-03-01 RX ORDER — AMLODIPINE BESYLATE 2.5 MG/1
2.5 TABLET ORAL
Qty: 30 TABLET | Refills: 2 | Status: SHIPPED | OUTPATIENT
Start: 2021-03-01 | End: 2021-05-24

## 2021-03-29 RX ORDER — LEVOTHYROXINE SODIUM 0.07 MG/1
75 TABLET ORAL DAILY
Qty: 30 TABLET | Refills: 3 | Status: SHIPPED | OUTPATIENT
Start: 2021-03-29 | End: 2021-07-26

## 2021-04-26 RX ORDER — FLUTICASONE PROPIONATE 50 MCG
SPRAY, SUSPENSION (ML) NASAL
Qty: 16 G | Refills: 6 | Status: SHIPPED | OUTPATIENT
Start: 2021-04-26 | End: 2021-11-24

## 2021-05-24 ENCOUNTER — OFFICE VISIT (OUTPATIENT)
Dept: FAMILY MEDICINE CLINIC | Facility: CLINIC | Age: 46
End: 2021-05-24

## 2021-05-24 VITALS
HEART RATE: 89 BPM | SYSTOLIC BLOOD PRESSURE: 134 MMHG | BODY MASS INDEX: 26 KG/M2 | OXYGEN SATURATION: 97 % | TEMPERATURE: 98 F | HEIGHT: 59 IN | WEIGHT: 129 LBS | DIASTOLIC BLOOD PRESSURE: 84 MMHG

## 2021-05-24 DIAGNOSIS — R43.2 LOSS OF TASTE: ICD-10-CM

## 2021-05-24 DIAGNOSIS — R53.83 FATIGUE, UNSPECIFIED TYPE: ICD-10-CM

## 2021-05-24 DIAGNOSIS — E03.9 HYPOTHYROIDISM, UNSPECIFIED TYPE: Primary | ICD-10-CM

## 2021-05-24 DIAGNOSIS — J30.1 ALLERGIC RHINITIS DUE TO POLLEN, UNSPECIFIED SEASONALITY: ICD-10-CM

## 2021-05-24 DIAGNOSIS — E78.01 FAMILIAL HYPERCHOLESTEROLEMIA: ICD-10-CM

## 2021-05-24 DIAGNOSIS — R50.9 FEVER, UNSPECIFIED FEVER CAUSE: ICD-10-CM

## 2021-05-24 DIAGNOSIS — F17.200 TOBACCO DEPENDENCE SYNDROME: ICD-10-CM

## 2021-05-24 DIAGNOSIS — Z00.00 PREVENTATIVE HEALTH CARE: ICD-10-CM

## 2021-05-24 PROCEDURE — 99213 OFFICE O/P EST LOW 20 MIN: CPT | Performed by: NURSE PRACTITIONER

## 2021-05-24 RX ORDER — PSEUDOEPHEDRINE HCL 30 MG
30 TABLET ORAL EVERY 4 HOURS PRN
Qty: 30 TABLET | Refills: 2 | Status: SHIPPED | OUTPATIENT
Start: 2021-05-24 | End: 2021-12-07

## 2021-05-24 RX ORDER — CETIRIZINE HYDROCHLORIDE 10 MG/1
10 TABLET ORAL DAILY
Qty: 30 TABLET | Refills: 2 | Status: SHIPPED | OUTPATIENT
Start: 2021-05-24 | End: 2021-12-07

## 2021-05-24 RX ORDER — AMLODIPINE BESYLATE 2.5 MG/1
2.5 TABLET ORAL
Qty: 30 TABLET | Refills: 0 | Status: SHIPPED | OUTPATIENT
Start: 2021-05-24 | End: 2021-06-21

## 2021-05-24 NOTE — PROGRESS NOTES
"    Michelle Sandhu is a 46 y.o. female.     46-year-old white female smoker with history of psychiatric issues, chronic back pain, depression, hypothyroidism, GERD, headaches, myalgias and recent surgery on left toe who comes in today with  1 week history of fever, extreme fatigue, sinuses, headache and body aches.  She states yesterday it started getting better.  Patient has not been vaccinated for Covid and checking her day to make sure she is not positive but she is refusing Covid vaccination.  Blood pressure 134/84 heart rate 88 with murmur she denies any chest pain, dyspnea, tachycardia or dizziness.  She did have appointment with cardiology for palpitations but EKG was unremarkable and 2D echo showed mild valve disease.  I placed her on metoprolol for her  palpitations but she stated she would like to wait made her feel.  She has cut back on caffeine use  Weight is up 6 pounds at 129 with a BMI of 26 she is up-to-date on eye exam her mammogram is due in October and she no longer does Paps due to hysterectomy    Covid test today  Schedule fasting blood work  Stay home until results return  Allergy medication as directed  May consider Covid vaccine           The following portions of the patient's history were reviewed and updated as appropriate: allergies, current medications, past family history, past medical history, past social history, past surgical history and problem list.    Vitals:    05/24/21 1252   BP: 134/84   BP Location: Left arm   Patient Position: Sitting   Cuff Size: Adult   Pulse: 89   Temp: 98 °F (36.7 °C)   TempSrc: Temporal   SpO2: 97%   Weight: 58.5 kg (129 lb)   Height: 149.9 cm (59\")     Body mass index is 26.05 kg/m².    Past Medical History:   Diagnosis Date   • Fibromyalgia, primary    • Hypertension    • Hypothyroidism    • Rheumatoid arthritis (CMS/HCC)      Past Surgical History:   Procedure Laterality Date   • EYE SURGERY     • EYE SURGERY     • FOOT SURGERY     • INNER EAR SURGERY  "    • OVARY SURGERY     • SUBTOTAL HYSTERECTOMY     • TUMOR REMOVAL       Family History   Problem Relation Age of Onset   • Hypertension Mother    • COPD Mother    • Heart disease Mother    • Hyperlipidemia Mother    • Diabetes Maternal Uncle    • Cancer Maternal Uncle    • Cancer Maternal Grandfather        There is no immunization history on file for this patient.    Office Visit on 11/16/2020   Component Date Value Ref Range Status   • T3, Total 11/16/2020 74  71 - 180 ng/dL Final   • TSH 11/16/2020 1.460  0.450 - 4.500 uIU/mL Final   • Free T4 11/16/2020 1.41  0.82 - 1.77 ng/dL Final   • Glucose 11/16/2020 123* 65 - 99 mg/dL Final   • BUN 11/16/2020 9  6 - 24 mg/dL Final   • Creatinine 11/16/2020 0.76  0.57 - 1.00 mg/dL Final   • eGFR Non  Am 11/16/2020 96  >59 mL/min/1.73 Final   • eGFR African Am 11/16/2020 110  >59 mL/min/1.73 Final   • BUN/Creatinine Ratio 11/16/2020 12  9 - 23 Final   • Sodium 11/16/2020 140  134 - 144 mmol/L Final   • Potassium 11/16/2020 3.7  3.5 - 5.2 mmol/L Final   • Chloride 11/16/2020 103  96 - 106 mmol/L Final   • Total CO2 11/16/2020 24  20 - 29 mmol/L Final   • Calcium 11/16/2020 9.4  8.7 - 10.2 mg/dL Final   • Total Protein 11/16/2020 6.8  6.0 - 8.5 g/dL Final   • Albumin 11/16/2020 4.3  3.8 - 4.8 g/dL Final   • Globulin 11/16/2020 2.5  1.5 - 4.5 g/dL Final   • A/G Ratio 11/16/2020 1.7  1.2 - 2.2 Final   • Total Bilirubin 11/16/2020 0.3  0.0 - 1.2 mg/dL Final   • Alkaline Phosphatase 11/16/2020 66  39 - 117 IU/L Final   • AST (SGOT) 11/16/2020 18  0 - 40 IU/L Final   • ALT (SGPT) 11/16/2020 16  0 - 32 IU/L Final         Review of Systems   Constitutional: Positive for fatigue and fever.   HENT: Positive for congestion and rhinorrhea.    Respiratory: Negative.    Cardiovascular: Negative.    Gastrointestinal: Negative.    Musculoskeletal: Positive for myalgias.   Skin: Negative.    Neurological: Positive for headache.   Psychiatric/Behavioral: Negative.        Objective    Physical Exam  Constitutional:       Appearance: Normal appearance.   HENT:      Head: Normocephalic.      Ears:      Comments: Bulging TMs clear fluid     Nose: Congestion and rhinorrhea present.      Mouth/Throat:      Comments: Postnasal drip  Cardiovascular:      Rate and Rhythm: Normal rate and regular rhythm.      Pulses: Normal pulses.      Heart sounds: Normal heart sounds.   Pulmonary:      Effort: Pulmonary effort is normal.      Breath sounds: Normal breath sounds.   Abdominal:      General: Bowel sounds are normal.   Musculoskeletal:         General: Normal range of motion.      Cervical back: Normal range of motion.   Skin:     General: Skin is warm and dry.   Neurological:      General: No focal deficit present.      Mental Status: She is alert and oriented to person, place, and time.   Psychiatric:         Mood and Affect: Mood normal.         Behavior: Behavior normal.         Procedures    Assessment/Plan   Problems Addressed this Visit        Allergies and Adverse Reactions    Allergic rhinitis due to pollen       Endocrine and Metabolic    Hypothyroidism - Primary    Relevant Orders    TSH+Free T4    T3       Tobacco    Tobacco dependence syndrome      Other Visit Diagnoses     Familial hypercholesterolemia        Relevant Orders    Lipid Panel With LDL / HDL Ratio    Preventative health care        Relevant Orders    Comprehensive Metabolic Panel    Loss of taste        Relevant Orders    COVID-19,[facility] ROUTINE, NP/OP SWAB IN TRANSPORT MEDIA OR ESWAB 72 HR TAT - Swab, Nasopharynx    Fatigue, unspecified type        Relevant Orders    COVID-19,[facility] ROUTINE, NP/OP SWAB IN TRANSPORT MEDIA OR ESWAB 72 HR TAT - Swab, Nasopharynx    Fever, unspecified fever cause          Diagnoses       Codes Comments    Hypothyroidism, unspecified type    -  Primary ICD-10-CM: E03.9  ICD-9-CM: 244.9     Familial hypercholesterolemia     ICD-10-CM: E78.01  ICD-9-CM: 272.0     Preventative health care      ICD-10-CM: Z00.00  ICD-9-CM: V70.0     Loss of taste     ICD-10-CM: R43.2  ICD-9-CM: 781.1     Fatigue, unspecified type     ICD-10-CM: R53.83  ICD-9-CM: 780.79     Allergic rhinitis due to pollen, unspecified seasonality     ICD-10-CM: J30.1  ICD-9-CM: 477.0     Tobacco dependence syndrome     ICD-10-CM: F17.200  ICD-9-CM: 305.1     Fever, unspecified fever cause     ICD-10-CM: R50.9  ICD-9-CM: 780.60             Current Outpatient Medications:   •  albuterol sulfate HFA (ProAir HFA) 108 (90 Base) MCG/ACT inhaler, Inhale 2 puffs Every 4 (Four) Hours As Needed for Shortness of Air., Disp: 19 g, Rfl: 11  •  amLODIPine (NORVASC) 2.5 MG tablet, Take 1 tablet by mouth every night at bedtime. No further refills till seen in office with fasting blood work, Disp: 30 tablet, Rfl: 0  •  fluticasone (FLONASE) 50 MCG/ACT nasal spray, INSTILL 2 SPRAYS INTO THE NOSTRILS ONCE DAILY AS DIRECTED, Disp: 16 g, Rfl: 6  •  levothyroxine (SYNTHROID, LEVOTHROID) 75 MCG tablet, TAKE 1 TABLET BY MOUTH DAILY ON AN EMPTY STOMACH, Disp: 30 tablet, Rfl: 3  •  cetirizine (ZyrTEC Allergy) 10 MG tablet, Take 1 tablet by mouth Daily., Disp: 30 tablet, Rfl: 2  •  pseudoephedrine (Sudafed) 30 MG tablet, Take 1 tablet by mouth Every 4 (Four) Hours As Needed for Congestion., Disp: 30 tablet, Rfl: 2

## 2021-05-24 NOTE — PATIENT INSTRUCTIONS
Take allergy medication as directed  Take home till Covid results return  Reconsider getting Covid vaccine  Schedule fasting blood work

## 2021-05-25 LAB
LABCORP SARS-COV-2, NAA 2 DAY TAT: NORMAL
SARS-COV-2 RNA RESP QL NAA+PROBE: NOT DETECTED

## 2021-06-02 RX ORDER — PRAVASTATIN SODIUM 10 MG
10 TABLET ORAL DAILY
Qty: 30 TABLET | Refills: 2 | Status: SHIPPED | OUTPATIENT
Start: 2021-06-02 | End: 2021-08-23

## 2021-06-21 RX ORDER — AMLODIPINE BESYLATE 2.5 MG/1
TABLET ORAL
Qty: 30 TABLET | Refills: 0 | Status: SHIPPED | OUTPATIENT
Start: 2021-06-21 | End: 2021-07-19

## 2021-07-19 RX ORDER — AMLODIPINE BESYLATE 2.5 MG/1
TABLET ORAL
Qty: 30 TABLET | Refills: 0 | Status: SHIPPED | OUTPATIENT
Start: 2021-07-19 | End: 2021-08-16

## 2021-07-26 RX ORDER — LEVOTHYROXINE SODIUM 0.07 MG/1
75 TABLET ORAL DAILY
Qty: 90 TABLET | Refills: 1 | Status: SHIPPED | OUTPATIENT
Start: 2021-07-26 | End: 2022-02-23

## 2021-08-16 RX ORDER — AMLODIPINE BESYLATE 2.5 MG/1
TABLET ORAL
Qty: 30 TABLET | Refills: 0 | Status: SHIPPED | OUTPATIENT
Start: 2021-08-16 | End: 2021-09-13

## 2021-08-23 RX ORDER — PRAVASTATIN SODIUM 10 MG
10 TABLET ORAL DAILY
Qty: 30 TABLET | Refills: 2 | Status: SHIPPED | OUTPATIENT
Start: 2021-08-23 | End: 2021-12-09

## 2021-09-13 RX ORDER — AMLODIPINE BESYLATE 2.5 MG/1
TABLET ORAL
Qty: 30 TABLET | Refills: 0 | Status: SHIPPED | OUTPATIENT
Start: 2021-09-13 | End: 2021-10-11

## 2021-10-11 RX ORDER — AMLODIPINE BESYLATE 2.5 MG/1
TABLET ORAL
Qty: 30 TABLET | Refills: 0 | Status: SHIPPED | OUTPATIENT
Start: 2021-10-11 | End: 2021-10-11

## 2021-10-11 RX ORDER — AMLODIPINE BESYLATE 2.5 MG/1
TABLET ORAL
Qty: 90 TABLET | Refills: 1 | Status: SHIPPED | OUTPATIENT
Start: 2021-10-11 | End: 2022-04-04

## 2021-11-24 RX ORDER — FLUTICASONE PROPIONATE 50 MCG
SPRAY, SUSPENSION (ML) NASAL
Qty: 16 G | Refills: 6 | Status: SHIPPED | OUTPATIENT
Start: 2021-11-24 | End: 2022-06-22

## 2021-11-29 RX ORDER — FLUTICASONE PROPIONATE 50 MCG
SPRAY, SUSPENSION (ML) NASAL
Qty: 48 G | OUTPATIENT
Start: 2021-11-29

## 2021-12-07 ENCOUNTER — OFFICE VISIT (OUTPATIENT)
Dept: FAMILY MEDICINE CLINIC | Facility: CLINIC | Age: 46
End: 2021-12-07

## 2021-12-07 VITALS
WEIGHT: 134.4 LBS | DIASTOLIC BLOOD PRESSURE: 85 MMHG | TEMPERATURE: 97.3 F | OXYGEN SATURATION: 99 % | HEIGHT: 59 IN | HEART RATE: 84 BPM | BODY MASS INDEX: 27.09 KG/M2 | SYSTOLIC BLOOD PRESSURE: 134 MMHG

## 2021-12-07 DIAGNOSIS — R00.2 PALPITATIONS: ICD-10-CM

## 2021-12-07 DIAGNOSIS — Z00.00 PREVENTATIVE HEALTH CARE: ICD-10-CM

## 2021-12-07 DIAGNOSIS — E03.9 HYPOTHYROIDISM, UNSPECIFIED TYPE: Primary | ICD-10-CM

## 2021-12-07 DIAGNOSIS — Z12.31 OTHER SCREENING MAMMOGRAM: ICD-10-CM

## 2021-12-07 DIAGNOSIS — F17.200 TOBACCO DEPENDENCE SYNDROME: ICD-10-CM

## 2021-12-07 DIAGNOSIS — E78.2 MIXED HYPERLIPIDEMIA: ICD-10-CM

## 2021-12-07 PROCEDURE — 99213 OFFICE O/P EST LOW 20 MIN: CPT | Performed by: NURSE PRACTITIONER

## 2021-12-07 RX ORDER — PROPRANOLOL HYDROCHLORIDE 60 MG/1
60 TABLET ORAL 2 TIMES DAILY
Qty: 60 TABLET | Refills: 2 | Status: SHIPPED | OUTPATIENT
Start: 2021-12-07 | End: 2022-02-28

## 2021-12-07 NOTE — PATIENT INSTRUCTIONS
Fasting blood work  Propranolol 60 mg twice daily  Monitor blood pressure and heart rate with parameters given and call office  follow-up 6 months

## 2021-12-07 NOTE — PROGRESS NOTES
"    Michelle Sandhu is a 46 y.o. female.     46-year-old female smoker with history of psychiatric issues, chronic back pain, depression, hypothyroidism, GERD, headache, myalgias who comes in today for follow-up visit  Blood pressure 134/84 heart rate 84 she denies any chest pain, dyspnea, or edema.  She still is having episodes of tachycardia but never took the metoprolol that is ordered for her.  She is not sure how fast the heart rate is because she never counted.  She admits to doing a lot of caffeine but does not want to cut back on caffeine.  I am going to order her some propranolol since she also has a history of migraines as well to try to help her heart rate and headaches and patient is going to monitor blood pressure and heart rate with parameters given call the office    Weight is up 5 pounds at 133 with a BMI 27.2  She is up-to-date on 2 Covid vaccines but need to schedule eye exam.  I am scheduling a mammogram and she has had a hysterectomy does no longer do Paps            Fasting blood work  Propranolol 60 mg twice daily  Monitor blood pressure and heart rate with parameters given and call office  follow-up 6 months         The following portions of the patient's history were reviewed and updated as appropriate: allergies, current medications, past family history, past medical history, past social history, past surgical history and problem list.    Vitals:    12/07/21 1302   BP: 134/85   BP Location: Right arm   Patient Position: Sitting   Cuff Size: Adult   Pulse: 84   Temp: 97.3 °F (36.3 °C)   TempSrc: Temporal   SpO2: 99%   Weight: 61 kg (134 lb 6.4 oz)   Height: 149.9 cm (59\")     Body mass index is 27.15 kg/m².    Past Medical History:   Diagnosis Date   • Fibromyalgia, primary    • Hypertension    • Hypothyroidism    • Rheumatoid arthritis (HCC)      Past Surgical History:   Procedure Laterality Date   • EYE SURGERY     • EYE SURGERY     • FOOT SURGERY     • INNER EAR SURGERY     • OVARY SURGERY   "   • SUBTOTAL HYSTERECTOMY     • TUMOR REMOVAL       Family History   Problem Relation Age of Onset   • Hypertension Mother    • COPD Mother    • Heart disease Mother    • Hyperlipidemia Mother    • Diabetes Maternal Uncle    • Cancer Maternal Uncle    • Cancer Maternal Grandfather      Immunization History   Administered Date(s) Administered   • COVID-19 (PFIZER) 08/24/2021, 09/14/2021       Results Encounter on 05/29/2021   Component Date Value Ref Range Status   • Glucose 06/01/2021 83  65 - 99 mg/dL Final   • BUN 06/01/2021 11  6 - 24 mg/dL Final   • Creatinine 06/01/2021 0.80  0.57 - 1.00 mg/dL Final   • eGFR Non  Am 06/01/2021 89  >59 mL/min/1.73 Final   • eGFR African Am 06/01/2021 103  >59 mL/min/1.73 Final    Comment: **[facility] currently reports eGFR in compliance with the current**    recommendations of the [employer]. [facility] will    update reporting as new guidelines are published from the NKF-ASN    Task force.     • BUN/Creatinine Ratio 06/01/2021 14  9 - 23 Final   • Sodium 06/01/2021 143  134 - 144 mmol/L Final   • Potassium 06/01/2021 4.8  3.5 - 5.2 mmol/L Final   • Chloride 06/01/2021 105  96 - 106 mmol/L Final   • Total CO2 06/01/2021 25  20 - 29 mmol/L Final   • Calcium 06/01/2021 9.9  8.7 - 10.2 mg/dL Final   • Total Protein 06/01/2021 6.9  6.0 - 8.5 g/dL Final   • Albumin 06/01/2021 4.6  3.8 - 4.8 g/dL Final   • Globulin 06/01/2021 2.3  1.5 - 4.5 g/dL Final   • A/G Ratio 06/01/2021 2.0  1.2 - 2.2 Final   • Total Bilirubin 06/01/2021 0.5  0.0 - 1.2 mg/dL Final   • Alkaline Phosphatase 06/01/2021 65  48 - 121 IU/L Final   • AST (SGOT) 06/01/2021 19  0 - 40 IU/L Final   • ALT (SGPT) 06/01/2021 13  0 - 32 IU/L Final   • Total Cholesterol 06/01/2021 242* 100 - 199 mg/dL Final   • Triglycerides 06/01/2021 75  0 - 149 mg/dL Final   • HDL Cholesterol 06/01/2021 60  >39 mg/dL Final   • VLDL Cholesterol Mau 06/01/2021 13  5 - 40 mg/dL Final   • LDL Chol Calc ([facility]) 06/01/2021 169* 0  - 99 mg/dL Final   • LDL/HDL RATIO 06/01/2021 2.8  0.0 - 3.2 ratio Final    Comment:                                     LDL/HDL Ratio                                              Men  Women                                1/2 Avg.Risk  1.0    1.5                                    Avg.Risk  3.6    3.2                                 2X Avg.Risk  6.2    5.0                                 3X Avg.Risk  8.0    6.1     • TSH 06/01/2021 3.180  0.450 - 4.500 uIU/mL Final   • Free T4 06/01/2021 1.27  0.82 - 1.77 ng/dL Final   • T3, Total 06/01/2021 94  71 - 180 ng/dL Final         Review of Systems   Constitutional: Negative.    HENT: Negative.    Respiratory: Negative.    Cardiovascular: Positive for palpitations.   Genitourinary: Negative.    Musculoskeletal: Negative.    Skin: Negative.    Psychiatric/Behavioral: Negative.        Objective   Physical Exam  Constitutional:       Appearance: Normal appearance.   HENT:      Head: Normocephalic.   Cardiovascular:      Rate and Rhythm: Normal rate and regular rhythm.      Pulses: Normal pulses.      Heart sounds: Normal heart sounds.   Pulmonary:      Effort: Pulmonary effort is normal.   Abdominal:      General: Bowel sounds are normal.   Skin:     General: Skin is warm.   Neurological:      General: No focal deficit present.      Mental Status: She is alert.   Psychiatric:         Mood and Affect: Mood normal.         Behavior: Behavior normal.         Procedures    Assessment/Plan   Problems Addressed this Visit        Cardiac and Vasculature    Palpitations       Endocrine and Metabolic    Hypothyroidism - Primary    Relevant Medications    propranolol (INDERAL) 60 MG tablet    Other Relevant Orders    TSH+Free T4    T3       Tobacco    Tobacco dependence syndrome      Other Visit Diagnoses     Preventative health care        Relevant Orders    CBC & Differential    Comprehensive Metabolic Panel    Mixed hyperlipidemia        Relevant Orders    Lipid Panel With LDL / HDL  Ratio    Other screening mammogram        Relevant Orders    Mammo Screening Bilateral With CAD      Diagnoses       Codes Comments    Hypothyroidism, unspecified type    -  Primary ICD-10-CM: E03.9  ICD-9-CM: 244.9     Preventative health care     ICD-10-CM: Z00.00  ICD-9-CM: V70.0     Mixed hyperlipidemia     ICD-10-CM: E78.2  ICD-9-CM: 272.2     Other screening mammogram     ICD-10-CM: Z12.31  ICD-9-CM: V76.12     Palpitations     ICD-10-CM: R00.2  ICD-9-CM: 785.1     Tobacco dependence syndrome     ICD-10-CM: F17.200  ICD-9-CM: 305.1             Current Outpatient Medications:   •  albuterol sulfate HFA (ProAir HFA) 108 (90 Base) MCG/ACT inhaler, Inhale 2 puffs Every 4 (Four) Hours As Needed for Shortness of Air., Disp: 19 g, Rfl: 11  •  amLODIPine (NORVASC) 2.5 MG tablet, TAKE 1 TABLET BY MOUTH AT BEDTIME, Disp: 90 tablet, Rfl: 1  •  fluticasone (FLONASE) 50 MCG/ACT nasal spray, INSTILL 2 SPRAYS INTO THE NOSTRILS ONCE DAILY AS DIRECTED, Disp: 16 g, Rfl: 6  •  levothyroxine (SYNTHROID, LEVOTHROID) 75 MCG tablet, TAKE 1 TABLET BY MOUTH DAILY ON AN EMPTY STOMACH, Disp: 90 tablet, Rfl: 1  •  pravastatin (PRAVACHOL) 10 MG tablet, TAKE 1 TABLET BY MOUTH DAILY, Disp: 30 tablet, Rfl: 2  •  propranolol (INDERAL) 60 MG tablet, Take 1 tablet by mouth 2 (Two) Times a Day., Disp: 60 tablet, Rfl: 2

## 2021-12-09 LAB
ALBUMIN SERPL-MCNC: 4.3 G/DL (ref 3.8–4.8)
ALBUMIN/GLOB SERPL: 1.8 {RATIO} (ref 1.2–2.2)
ALP SERPL-CCNC: 64 IU/L (ref 44–121)
ALT SERPL-CCNC: 10 IU/L (ref 0–32)
AST SERPL-CCNC: 16 IU/L (ref 0–40)
BASOPHILS # BLD AUTO: 0.1 X10E3/UL (ref 0–0.2)
BASOPHILS NFR BLD AUTO: 1 %
BILIRUB SERPL-MCNC: 0.5 MG/DL (ref 0–1.2)
BUN SERPL-MCNC: 12 MG/DL (ref 6–24)
BUN/CREAT SERPL: 15 (ref 9–23)
CALCIUM SERPL-MCNC: 9.5 MG/DL (ref 8.7–10.2)
CHLORIDE SERPL-SCNC: 107 MMOL/L (ref 96–106)
CHOLEST SERPL-MCNC: 258 MG/DL (ref 100–199)
CO2 SERPL-SCNC: 22 MMOL/L (ref 20–29)
CREAT SERPL-MCNC: 0.79 MG/DL (ref 0.57–1)
EOSINOPHIL # BLD AUTO: 0.8 X10E3/UL (ref 0–0.4)
EOSINOPHIL NFR BLD AUTO: 11 %
ERYTHROCYTE [DISTWIDTH] IN BLOOD BY AUTOMATED COUNT: 12 % (ref 11.7–15.4)
GLOBULIN SER CALC-MCNC: 2.4 G/DL (ref 1.5–4.5)
GLUCOSE SERPL-MCNC: 89 MG/DL (ref 65–99)
HCT VFR BLD AUTO: 45.4 % (ref 34–46.6)
HDLC SERPL-MCNC: 56 MG/DL
HGB BLD-MCNC: 16 G/DL (ref 11.1–15.9)
IMM GRANULOCYTES # BLD AUTO: 0 X10E3/UL (ref 0–0.1)
IMM GRANULOCYTES NFR BLD AUTO: 0 %
LDLC SERPL CALC-MCNC: 188 MG/DL (ref 0–99)
LDLC/HDLC SERPL: 3.4 RATIO (ref 0–3.2)
LYMPHOCYTES # BLD AUTO: 2.4 X10E3/UL (ref 0.7–3.1)
LYMPHOCYTES NFR BLD AUTO: 34 %
MCH RBC QN AUTO: 33.6 PG (ref 26.6–33)
MCHC RBC AUTO-ENTMCNC: 35.2 G/DL (ref 31.5–35.7)
MCV RBC AUTO: 95 FL (ref 79–97)
MONOCYTES # BLD AUTO: 0.6 X10E3/UL (ref 0.1–0.9)
MONOCYTES NFR BLD AUTO: 8 %
NEUTROPHILS # BLD AUTO: 3.2 X10E3/UL (ref 1.4–7)
NEUTROPHILS NFR BLD AUTO: 46 %
PLATELET # BLD AUTO: 284 X10E3/UL (ref 150–450)
POTASSIUM SERPL-SCNC: 4.4 MMOL/L (ref 3.5–5.2)
PROT SERPL-MCNC: 6.7 G/DL (ref 6–8.5)
RBC # BLD AUTO: 4.76 X10E6/UL (ref 3.77–5.28)
SODIUM SERPL-SCNC: 144 MMOL/L (ref 134–144)
T3 SERPL-MCNC: 98 NG/DL (ref 71–180)
T4 FREE SERPL-MCNC: 1.36 NG/DL (ref 0.82–1.77)
TRIGL SERPL-MCNC: 81 MG/DL (ref 0–149)
TSH SERPL DL<=0.005 MIU/L-ACNC: 2 UIU/ML (ref 0.45–4.5)
VLDLC SERPL CALC-MCNC: 14 MG/DL (ref 5–40)
WBC # BLD AUTO: 7 X10E3/UL (ref 3.4–10.8)

## 2021-12-09 RX ORDER — PRAVASTATIN SODIUM 20 MG
20 TABLET ORAL DAILY
Qty: 90 TABLET | Refills: 0 | Status: SHIPPED | OUTPATIENT
Start: 2021-12-09 | End: 2022-03-06

## 2021-12-16 DIAGNOSIS — R92.8 ABNORMAL MAMMOGRAM: Primary | ICD-10-CM

## 2022-01-06 DIAGNOSIS — R92.8 ABNORMAL MAMMOGRAM: ICD-10-CM

## 2022-02-23 RX ORDER — LEVOTHYROXINE SODIUM 0.07 MG/1
75 TABLET ORAL DAILY
Qty: 90 TABLET | Refills: 1 | Status: SHIPPED | OUTPATIENT
Start: 2022-02-23 | End: 2022-08-22

## 2022-02-28 RX ORDER — PROPRANOLOL HYDROCHLORIDE 60 MG/1
TABLET ORAL
Qty: 60 TABLET | Refills: 2 | Status: SHIPPED | OUTPATIENT
Start: 2022-02-28 | End: 2022-05-23

## 2022-03-06 RX ORDER — PRAVASTATIN SODIUM 20 MG
20 TABLET ORAL DAILY
Qty: 90 TABLET | Refills: 0 | Status: SHIPPED | OUTPATIENT
Start: 2022-03-06 | End: 2022-05-23

## 2022-04-04 RX ORDER — AMLODIPINE BESYLATE 2.5 MG/1
TABLET ORAL
Qty: 90 TABLET | Refills: 1 | Status: SHIPPED | OUTPATIENT
Start: 2022-04-04 | End: 2022-09-20

## 2022-05-23 RX ORDER — PRAVASTATIN SODIUM 20 MG
20 TABLET ORAL DAILY
Qty: 90 TABLET | Refills: 0 | Status: SHIPPED | OUTPATIENT
Start: 2022-05-23 | End: 2022-08-22

## 2022-05-23 RX ORDER — PROPRANOLOL HYDROCHLORIDE 60 MG/1
TABLET ORAL
Qty: 60 TABLET | Refills: 2 | Status: SHIPPED | OUTPATIENT
Start: 2022-05-23 | End: 2022-08-22

## 2022-06-07 ENCOUNTER — OFFICE VISIT (OUTPATIENT)
Dept: FAMILY MEDICINE CLINIC | Facility: CLINIC | Age: 47
End: 2022-06-07

## 2022-06-07 VITALS
HEIGHT: 59 IN | BODY MASS INDEX: 26.81 KG/M2 | TEMPERATURE: 97.7 F | SYSTOLIC BLOOD PRESSURE: 133 MMHG | DIASTOLIC BLOOD PRESSURE: 86 MMHG | OXYGEN SATURATION: 97 % | WEIGHT: 133 LBS | HEART RATE: 79 BPM

## 2022-06-07 DIAGNOSIS — E78.2 MIXED HYPERLIPIDEMIA: ICD-10-CM

## 2022-06-07 DIAGNOSIS — D58.2 ELEVATED HEMOGLOBIN: ICD-10-CM

## 2022-06-07 DIAGNOSIS — E03.9 HYPOTHYROIDISM, UNSPECIFIED TYPE: Primary | ICD-10-CM

## 2022-06-07 DIAGNOSIS — Z00.00 PREVENTATIVE HEALTH CARE: ICD-10-CM

## 2022-06-07 DIAGNOSIS — J44.9 COPD WITH ASTHMA: ICD-10-CM

## 2022-06-07 PROCEDURE — 99213 OFFICE O/P EST LOW 20 MIN: CPT | Performed by: NURSE PRACTITIONER

## 2022-06-07 RX ORDER — ALBUTEROL SULFATE 90 UG/1
2 AEROSOL, METERED RESPIRATORY (INHALATION) EVERY 4 HOURS PRN
Qty: 19 G | Refills: 11 | Status: SHIPPED | OUTPATIENT
Start: 2022-06-07

## 2022-06-07 NOTE — PROGRESS NOTES
"    Michelle Sandhu is a 47 y.o. female.     47-year-old female smoker with depression, chronic back pain, hypothyroidism, GERD, headaches, myalgias who comes in today for follow-up visit    Blood pressure 132/86 heart rate 78 she denies any chest pain, dyspnea, tachycardia or dizziness.  She was started on propranolol last visit for blood pressure and for headaches and both have improved    Patient still smokes and does have some dyspnea with exertion especially during summer heat.  I ordered nebulizer for her and hopefully her insurance will cover that if so I will order her nebulizer treatments.  She is currently using an inhaler.  She has not quit smoking    Patient's last blood work showed elevated lipid panel and elevated hemoglobin we will be doing labs again    Patient up-to-date on 2 COVID vaccines eye exam mammogram and had a hysterectomy no longer does Pap smears.  Weight is 133 with a BMI 26.9            Home nebulizer 1-2 times a day  Fasting blood work   Monitor blood pressures at home  Stop smoking  Follow-up 6 months           The following portions of the patient's history were reviewed and updated as appropriate: allergies, current medications, past family history, past medical history, past social history, past surgical history and problem list.    Vitals:    06/07/22 1406   BP: 133/86   BP Location: Right arm   Patient Position: Sitting   Cuff Size: Adult   Pulse: 79   Temp: 97.7 °F (36.5 °C)   TempSrc: Infrared   SpO2: 97%   Weight: 60.3 kg (133 lb)   Height: 149.9 cm (59.02\")     Body mass index is 26.85 kg/m².    Past Medical History:   Diagnosis Date   • Fibromyalgia, primary    • Hypertension    • Hypothyroidism    • Rheumatoid arthritis (HCC)      Past Surgical History:   Procedure Laterality Date   • EYE SURGERY     • EYE SURGERY     • FOOT SURGERY     • INNER EAR SURGERY     • OVARY SURGERY     • SUBTOTAL HYSTERECTOMY     • TUMOR REMOVAL       Family History   Problem Relation Age of Onset "   • Hypertension Mother    • COPD Mother    • Heart disease Mother    • Hyperlipidemia Mother    • Diabetes Maternal Uncle    • Cancer Maternal Uncle    • Cancer Maternal Grandfather      Immunization History   Administered Date(s) Administered   • COVID-19 (PFIZER) PURPLE CAP 08/24/2021, 09/14/2021       Office Visit on 12/07/2021   Component Date Value Ref Range Status   • T3, Total 12/08/2021 98  71 - 180 ng/dL Final   • TSH 12/08/2021 2.000  0.450 - 4.500 uIU/mL Final   • Free T4 12/08/2021 1.36  0.82 - 1.77 ng/dL Final   • Total Cholesterol 12/08/2021 258 (A) 100 - 199 mg/dL Final   • Triglycerides 12/08/2021 81  0 - 149 mg/dL Final   • HDL Cholesterol 12/08/2021 56  >39 mg/dL Final   • VLDL Cholesterol Mau 12/08/2021 14  5 - 40 mg/dL Final   • LDL Chol Calc ([facility]) 12/08/2021 188 (A) 0 - 99 mg/dL Final   • LDL/HDL RATIO 12/08/2021 3.4 (A) 0.0 - 3.2 ratio Final    Comment:                                     LDL/HDL Ratio                                              Men  Women                                1/2 Avg.Risk  1.0    1.5                                    Avg.Risk  3.6    3.2                                 2X Avg.Risk  6.2    5.0                                 3X Avg.Risk  8.0    6.1     • Glucose 12/08/2021 89  65 - 99 mg/dL Final   • BUN 12/08/2021 12  6 - 24 mg/dL Final   • Creatinine 12/08/2021 0.79  0.57 - 1.00 mg/dL Final   • eGFR Non  Am 12/08/2021 91  >59 mL/min/1.73 Final   • eGFR African Am 12/08/2021 105  >59 mL/min/1.73 Final    Comment: **In accordance with recommendations from the NKF-ASN Task force,**    [facility] is in the process of updating its eGFR calculation to the    2021 CKD-EPI creatinine equation that estimates kidney function    without a race variable.     • BUN/Creatinine Ratio 12/08/2021 15  9 - 23 Final   • Sodium 12/08/2021 144  134 - 144 mmol/L Final   • Potassium 12/08/2021 4.4  3.5 - 5.2 mmol/L Final   • Chloride 12/08/2021 107 (A) 96 - 106 mmol/L Final   •  Total CO2 12/08/2021 22  20 - 29 mmol/L Final   • Calcium 12/08/2021 9.5  8.7 - 10.2 mg/dL Final   • Total Protein 12/08/2021 6.7  6.0 - 8.5 g/dL Final   • Albumin 12/08/2021 4.3  3.8 - 4.8 g/dL Final   • Globulin 12/08/2021 2.4  1.5 - 4.5 g/dL Final   • A/G Ratio 12/08/2021 1.8  1.2 - 2.2 Final   • Total Bilirubin 12/08/2021 0.5  0.0 - 1.2 mg/dL Final   • Alkaline Phosphatase 12/08/2021 64  44 - 121 IU/L Final                  **Please note reference interval change**   • AST (SGOT) 12/08/2021 16  0 - 40 IU/L Final   • ALT (SGPT) 12/08/2021 10  0 - 32 IU/L Final   • WBC 12/08/2021 7.0  3.4 - 10.8 x10E3/uL Final   • RBC 12/08/2021 4.76  3.77 - 5.28 x10E6/uL Final   • Hemoglobin 12/08/2021 16.0 (A) 11.1 - 15.9 g/dL Final   • Hematocrit 12/08/2021 45.4  34.0 - 46.6 % Final   • MCV 12/08/2021 95  79 - 97 fL Final   • MCH 12/08/2021 33.6 (A) 26.6 - 33.0 pg Final   • MCHC 12/08/2021 35.2  31.5 - 35.7 g/dL Final   • RDW 12/08/2021 12.0  11.7 - 15.4 % Final   • Platelets 12/08/2021 284  150 - 450 x10E3/uL Final   • Neutrophil Rel % 12/08/2021 46  Not Estab. % Final   • Lymphocyte Rel % 12/08/2021 34  Not Estab. % Final   • Monocyte Rel % 12/08/2021 8  Not Estab. % Final   • Eosinophil Rel % 12/08/2021 11  Not Estab. % Final   • Basophil Rel % 12/08/2021 1  Not Estab. % Final   • Neutrophils Absolute 12/08/2021 3.2  1.4 - 7.0 x10E3/uL Final   • Lymphocytes Absolute 12/08/2021 2.4  0.7 - 3.1 x10E3/uL Final   • Monocytes Absolute 12/08/2021 0.6  0.1 - 0.9 x10E3/uL Final   • Eosinophils Absolute 12/08/2021 0.8 (A) 0.0 - 0.4 x10E3/uL Final   • Basophils Absolute 12/08/2021 0.1  0.0 - 0.2 x10E3/uL Final   • Immature Granulocyte Rel % 12/08/2021 0  Not Estab. % Final   • Immature Grans Absolute 12/08/2021 0.0  0.0 - 0.1 x10E3/uL Final         Review of Systems   Constitutional: Negative.    HENT: Negative.    Respiratory: Negative.    Cardiovascular: Negative.    Gastrointestinal: Negative.    Genitourinary: Negative.     Musculoskeletal: Negative.    Skin: Negative.    Neurological: Negative.    Psychiatric/Behavioral: Negative.        Objective   Physical Exam  Constitutional:       Appearance: Normal appearance.   HENT:      Head: Normocephalic.   Cardiovascular:      Rate and Rhythm: Normal rate and regular rhythm.      Pulses: Normal pulses.      Heart sounds: Normal heart sounds.   Pulmonary:      Effort: Pulmonary effort is normal.      Breath sounds: Normal breath sounds.   Abdominal:      General: Bowel sounds are normal.   Musculoskeletal:         General: Normal range of motion.   Skin:     General: Skin is warm and dry.   Neurological:      General: No focal deficit present.      Mental Status: She is alert and oriented to person, place, and time.   Psychiatric:         Mood and Affect: Mood normal.         Procedures    Assessment & Plan   Diagnoses and all orders for this visit:    1. Hypothyroidism, unspecified type (Primary)  -     TSH+Free T4; Future  -     T3; Future    2. Mixed hyperlipidemia  -     Lipid Panel With LDL / HDL Ratio; Future    3. Preventative health care  -     Comprehensive Metabolic Panel; Future    4. Elevated hemoglobin (HCC)  -     CBC & Differential; Future    5. COPD with asthma (HCC)  -     Home Nebulizer    Other orders  -     albuterol sulfate HFA (ProAir HFA) 108 (90 Base) MCG/ACT inhaler; Inhale 2 puffs Every 4 (Four) Hours As Needed for Shortness of Air.  Dispense: 19 g; Refill: 11          Current Outpatient Medications:   •  albuterol sulfate HFA (ProAir HFA) 108 (90 Base) MCG/ACT inhaler, Inhale 2 puffs Every 4 (Four) Hours As Needed for Shortness of Air., Disp: 19 g, Rfl: 11  •  amLODIPine (NORVASC) 2.5 MG tablet, TAKE 1 TABLET BY MOUTH AT BEDTIME, Disp: 90 tablet, Rfl: 1  •  fluticasone (FLONASE) 50 MCG/ACT nasal spray, INSTILL 2 SPRAYS INTO THE NOSTRILS ONCE DAILY AS DIRECTED, Disp: 16 g, Rfl: 6  •  levothyroxine (SYNTHROID, LEVOTHROID) 75 MCG tablet, TAKE 1 TABLET BY MOUTH  DAILY ON AN EMPTY STOMACH, Disp: 90 tablet, Rfl: 1  •  pravastatin (PRAVACHOL) 20 MG tablet, TAKE 1 TABLET BY MOUTH DAILY, Disp: 90 tablet, Rfl: 0  •  propranolol (INDERAL) 60 MG tablet, TAKE 1 TABLET BY MOUTH TWICE DAILY, Disp: 60 tablet, Rfl: 2           Evon Luke, APRN 6/7/2022 14:52 EDT  This note has been electronically signed

## 2022-06-07 NOTE — PATIENT INSTRUCTIONS
Home nebulizer 1-2 times a day  Fasting blood work   Monitor blood pressures at home  Stop smoking  Follow-up 6 months

## 2022-06-09 LAB
ALBUMIN SERPL-MCNC: 4.2 G/DL (ref 3.8–4.8)
ALBUMIN/GLOB SERPL: 1.8 {RATIO} (ref 1.2–2.2)
ALP SERPL-CCNC: 62 IU/L (ref 44–121)
ALT SERPL-CCNC: 15 IU/L (ref 0–32)
AST SERPL-CCNC: 15 IU/L (ref 0–40)
BASOPHILS # BLD AUTO: 0.1 X10E3/UL (ref 0–0.2)
BASOPHILS NFR BLD AUTO: 1 %
BILIRUB SERPL-MCNC: 0.3 MG/DL (ref 0–1.2)
BUN SERPL-MCNC: 8 MG/DL (ref 6–24)
BUN/CREAT SERPL: 11 (ref 9–23)
CALCIUM SERPL-MCNC: 9.3 MG/DL (ref 8.7–10.2)
CHLORIDE SERPL-SCNC: 104 MMOL/L (ref 96–106)
CHOLEST SERPL-MCNC: 246 MG/DL (ref 100–199)
CO2 SERPL-SCNC: 25 MMOL/L (ref 20–29)
CREAT SERPL-MCNC: 0.74 MG/DL (ref 0.57–1)
EGFRCR SERPLBLD CKD-EPI 2021: 101 ML/MIN/1.73
EOSINOPHIL # BLD AUTO: 0.8 X10E3/UL (ref 0–0.4)
EOSINOPHIL NFR BLD AUTO: 11 %
ERYTHROCYTE [DISTWIDTH] IN BLOOD BY AUTOMATED COUNT: 12.1 % (ref 11.7–15.4)
GLOBULIN SER CALC-MCNC: 2.4 G/DL (ref 1.5–4.5)
GLUCOSE SERPL-MCNC: 91 MG/DL (ref 65–99)
HCT VFR BLD AUTO: 47.1 % (ref 34–46.6)
HDLC SERPL-MCNC: 59 MG/DL
HGB BLD-MCNC: 15.7 G/DL (ref 11.1–15.9)
IMM GRANULOCYTES # BLD AUTO: 0 X10E3/UL (ref 0–0.1)
IMM GRANULOCYTES NFR BLD AUTO: 0 %
LDLC SERPL CALC-MCNC: 175 MG/DL (ref 0–99)
LDLC/HDLC SERPL: 3 RATIO (ref 0–3.2)
LYMPHOCYTES # BLD AUTO: 2.5 X10E3/UL (ref 0.7–3.1)
LYMPHOCYTES NFR BLD AUTO: 35 %
MCH RBC QN AUTO: 33.1 PG (ref 26.6–33)
MCHC RBC AUTO-ENTMCNC: 33.3 G/DL (ref 31.5–35.7)
MCV RBC AUTO: 99 FL (ref 79–97)
MONOCYTES # BLD AUTO: 0.7 X10E3/UL (ref 0.1–0.9)
MONOCYTES NFR BLD AUTO: 10 %
NEUTROPHILS # BLD AUTO: 3.2 X10E3/UL (ref 1.4–7)
NEUTROPHILS NFR BLD AUTO: 43 %
PLATELET # BLD AUTO: 291 X10E3/UL (ref 150–450)
POTASSIUM SERPL-SCNC: 4.5 MMOL/L (ref 3.5–5.2)
PROT SERPL-MCNC: 6.6 G/DL (ref 6–8.5)
RBC # BLD AUTO: 4.75 X10E6/UL (ref 3.77–5.28)
SODIUM SERPL-SCNC: 141 MMOL/L (ref 134–144)
T3 SERPL-MCNC: 87 NG/DL (ref 71–180)
T4 FREE SERPL-MCNC: 1.32 NG/DL (ref 0.82–1.77)
TRIGL SERPL-MCNC: 74 MG/DL (ref 0–149)
TSH SERPL DL<=0.005 MIU/L-ACNC: 2.88 UIU/ML (ref 0.45–4.5)
VLDLC SERPL CALC-MCNC: 12 MG/DL (ref 5–40)
WBC # BLD AUTO: 7.3 X10E3/UL (ref 3.4–10.8)

## 2022-06-10 ENCOUNTER — TELEPHONE (OUTPATIENT)
Dept: FAMILY MEDICINE CLINIC | Facility: CLINIC | Age: 47
End: 2022-06-10

## 2022-06-10 DIAGNOSIS — F43.21 GRIEF REACTION: Primary | ICD-10-CM

## 2022-06-10 DIAGNOSIS — F41.0 PANIC ATTACK AS REACTION TO STRESS: ICD-10-CM

## 2022-06-10 DIAGNOSIS — F43.0 PANIC ATTACK AS REACTION TO STRESS: ICD-10-CM

## 2022-06-10 RX ORDER — ALPRAZOLAM 0.25 MG/1
0.25 TABLET ORAL 2 TIMES DAILY PRN
Qty: 10 TABLET | Refills: 0 | Status: SHIPPED | OUTPATIENT
Start: 2022-06-10 | End: 2022-06-16 | Stop reason: SDUPTHER

## 2022-06-10 NOTE — TELEPHONE ENCOUNTER
Caller: Michelle Sandhu    Relationship: Self    Best call back number:     What medication are you requesting: NERVE PILLS PANIC ATTACK    What are your current symptoms: ALBERTA' JUST PASSED    How long have you been experiencing symptoms: 1 DAY    Have you had these symptoms before:    [] Yes  [] No    Have you been treated for these symptoms before:   [] Yes  [] No    If a prescription is needed, what is your preferred pharmacy and phone number:  [facility]  [address]  [phone]    Additional notes:  PATIENT IS CALLING IN STATING THAT SHE FOUND HER FINACEE'  ON YESTERDAY AND SHE FEELS LIKE SHE IS HAVING PANIC ATTACKS AND HER NERVES ARE BAD.  SHE WANTS TO KNOW IF DR SANTAMARIA CAN CALL HER IN SOME MEDICATION .

## 2022-06-10 NOTE — TELEPHONE ENCOUNTER
I pulled patient inspect which returned fine.  I have sent her in 10 Xanax.  She can take 1 in the morning and 1 in the evening.  Please send my condolences and have her follow back up with Evon for any additional needs.

## 2022-06-16 DIAGNOSIS — F43.0 PANIC ATTACK AS REACTION TO STRESS: ICD-10-CM

## 2022-06-16 DIAGNOSIS — F41.0 PANIC ATTACK AS REACTION TO STRESS: ICD-10-CM

## 2022-06-16 DIAGNOSIS — F43.21 GRIEF REACTION: ICD-10-CM

## 2022-06-16 NOTE — TELEPHONE ENCOUNTER
Caller: Michelle Sandhu    Relationship: Self    Best call back number: [phone]    Requested Prescriptions:   Requested Prescriptions     Pending Prescriptions Disp Refills   • ALPRAZolam (XANAX) 0.25 MG tablet 10 tablet 0     Sig: Take 1 tablet by mouth 2 (Two) Times a Day As Needed for Anxiety.        Pharmacy where request should be sent: [id number] - [place], [place] - [address] AT [address] [phone] [address] [phone] [place]     Additional details provided by patient: HAS 1 PILL LEFT     Does the patient have less than a 3 day supply:  [x] Yes  [] No    Marcel James Rep   06/16/22 15:21 EDT

## 2022-06-20 RX ORDER — ALPRAZOLAM 0.25 MG/1
0.25 TABLET ORAL 2 TIMES DAILY PRN
Qty: 60 TABLET | Refills: 0 | Status: SHIPPED | OUTPATIENT
Start: 2022-06-20 | End: 2023-04-05

## 2022-06-22 RX ORDER — FLUTICASONE PROPIONATE 50 MCG
SPRAY, SUSPENSION (ML) NASAL
Qty: 16 G | Refills: 6 | Status: SHIPPED | OUTPATIENT
Start: 2022-06-22 | End: 2022-12-19

## 2022-07-19 ENCOUNTER — OFFICE VISIT (OUTPATIENT)
Dept: FAMILY MEDICINE CLINIC | Facility: CLINIC | Age: 47
End: 2022-07-19

## 2022-07-19 VITALS
HEIGHT: 59 IN | TEMPERATURE: 97.6 F | OXYGEN SATURATION: 97 % | SYSTOLIC BLOOD PRESSURE: 150 MMHG | WEIGHT: 130.2 LBS | DIASTOLIC BLOOD PRESSURE: 90 MMHG | BODY MASS INDEX: 26.25 KG/M2 | HEART RATE: 74 BPM

## 2022-07-19 DIAGNOSIS — F17.200 TOBACCO DEPENDENCE SYNDROME: ICD-10-CM

## 2022-07-19 DIAGNOSIS — I10 BENIGN ESSENTIAL HYPERTENSION: Primary | ICD-10-CM

## 2022-07-19 DIAGNOSIS — F32.89 OTHER DEPRESSION: ICD-10-CM

## 2022-07-19 DIAGNOSIS — F43.21 GRIEF REACTION: ICD-10-CM

## 2022-07-19 PROCEDURE — 99213 OFFICE O/P EST LOW 20 MIN: CPT | Performed by: NURSE PRACTITIONER

## 2022-07-19 NOTE — PATIENT INSTRUCTIONS
Take 1 Xanax at bedtime for insomnia  Take Xanax during the day if needed  Try to get out of the house as much as possible  If any thoughts of suicide go to the emergency room

## 2022-07-19 NOTE — PROGRESS NOTES
"    Michelle Sandhu is a 47 y.o. female.     47-year-old female smoker with depression, chronic back pain, hypothyroidism, GERD, headaches, myalgias who comes in today for follow-up visit.  In  patient's fiancé  and she has been having a hard time with depression and sleeping.  She was called in some Xanax but states she does not like to take it and has not been taking it much.  I encouraged her to at least take it at night so she can get some sleep since not sleeping well to make her symptoms worse.  Also encouraged her to take during today if needed until she works through her grieving process.  She denies any suicidal ideation    Blood pressure elevated 150/90 heart rate 74 denies any chest pain, dyspnea, tachycardia or dizziness          Take 1 Xanax at bedtime for insomnia  Take Xanax during the day if needed  Try to get out of the house as much as possible  If any thoughts of suicide go to the emergency room         The following portions of the patient's history were reviewed and updated as appropriate: allergies, current medications, past family history, past medical history, past social history, past surgical history and problem list.    Vitals:    22 1454   BP: 150/90   BP Location: Left arm   Patient Position: Sitting   Cuff Size: Adult   Pulse: 74   Temp: 97.6 °F (36.4 °C)   TempSrc: Temporal   SpO2: 97%   Weight: 59.1 kg (130 lb 3.2 oz)   Height: 149.9 cm (59\")     Body mass index is 26.3 kg/m².    Past Medical History:   Diagnosis Date   • Fibromyalgia, primary    • Hypertension    • Hypothyroidism    • Rheumatoid arthritis (HCC)      Past Surgical History:   Procedure Laterality Date   • EYE SURGERY     • EYE SURGERY     • FOOT SURGERY     • INNER EAR SURGERY     • OVARY SURGERY     • SUBTOTAL HYSTERECTOMY     • TUMOR REMOVAL       Family History   Problem Relation Age of Onset   • Hypertension Mother    • COPD Mother    • Heart disease Mother    • Hyperlipidemia Mother    • Diabetes " Maternal Uncle    • Cancer Maternal Uncle    • Cancer Maternal Grandfather      Immunization History   Administered Date(s) Administered   • COVID-19 (PFIZER) PURPLE CAP 08/24/2021, 09/14/2021       Orders Only on 06/08/2022   Component Date Value Ref Range Status   • WBC 06/08/2022 7.3  3.4 - 10.8 x10E3/uL Final   • RBC 06/08/2022 4.75  3.77 - 5.28 x10E6/uL Final   • Hemoglobin 06/08/2022 15.7  11.1 - 15.9 g/dL Final   • Hematocrit 06/08/2022 47.1 (A) 34.0 - 46.6 % Final   • MCV 06/08/2022 99 (A) 79 - 97 fL Final   • MCH 06/08/2022 33.1 (A) 26.6 - 33.0 pg Final   • MCHC 06/08/2022 33.3  31.5 - 35.7 g/dL Final   • RDW 06/08/2022 12.1  11.7 - 15.4 % Final   • Platelets 06/08/2022 291  150 - 450 x10E3/uL Final   • Neutrophil Rel % 06/08/2022 43  Not Estab. % Final   • Lymphocyte Rel % 06/08/2022 35  Not Estab. % Final   • Monocyte Rel % 06/08/2022 10  Not Estab. % Final   • Eosinophil Rel % 06/08/2022 11  Not Estab. % Final   • Basophil Rel % 06/08/2022 1  Not Estab. % Final   • Neutrophils Absolute 06/08/2022 3.2  1.4 - 7.0 x10E3/uL Final   • Lymphocytes Absolute 06/08/2022 2.5  0.7 - 3.1 x10E3/uL Final   • Monocytes Absolute 06/08/2022 0.7  0.1 - 0.9 x10E3/uL Final   • Eosinophils Absolute 06/08/2022 0.8 (A) 0.0 - 0.4 x10E3/uL Final   • Basophils Absolute 06/08/2022 0.1  0.0 - 0.2 x10E3/uL Final   • Immature Granulocyte Rel % 06/08/2022 0  Not Estab. % Final   • Immature Grans Absolute 06/08/2022 0.0  0.0 - 0.1 x10E3/uL Final   • TSH 06/08/2022 2.880  0.450 - 4.500 uIU/mL Final   • Free T4 06/08/2022 1.32  0.82 - 1.77 ng/dL Final   • Glucose 06/08/2022 91  65 - 99 mg/dL Final   • BUN 06/08/2022 8  6 - 24 mg/dL Final   • Creatinine 06/08/2022 0.74  0.57 - 1.00 mg/dL Final   • EGFR Result 06/08/2022 101  >59 mL/min/1.73 Final   • BUN/Creatinine Ratio 06/08/2022 11  9 - 23 Final   • Sodium 06/08/2022 141  134 - 144 mmol/L Final   • Potassium 06/08/2022 4.5  3.5 - 5.2 mmol/L Final   • Chloride 06/08/2022 104  96 - 106  mmol/L Final   • Total CO2 06/08/2022 25  20 - 29 mmol/L Final   • Calcium 06/08/2022 9.3  8.7 - 10.2 mg/dL Final   • Total Protein 06/08/2022 6.6  6.0 - 8.5 g/dL Final   • Albumin 06/08/2022 4.2  3.8 - 4.8 g/dL Final   • Globulin 06/08/2022 2.4  1.5 - 4.5 g/dL Final   • A/G Ratio 06/08/2022 1.8  1.2 - 2.2 Final   • Total Bilirubin 06/08/2022 0.3  0.0 - 1.2 mg/dL Final   • Alkaline Phosphatase 06/08/2022 62  44 - 121 IU/L Final   • AST (SGOT) 06/08/2022 15  0 - 40 IU/L Final   • ALT (SGPT) 06/08/2022 15  0 - 32 IU/L Final   • Total Cholesterol 06/08/2022 246 (A) 100 - 199 mg/dL Final   • Triglycerides 06/08/2022 74  0 - 149 mg/dL Final   • HDL Cholesterol 06/08/2022 59  >39 mg/dL Final   • VLDL Cholesterol Mau 06/08/2022 12  5 - 40 mg/dL Final   • LDL Chol Calc ([facility]) 06/08/2022 175 (A) 0 - 99 mg/dL Final   • LDL/HDL RATIO 06/08/2022 3.0  0.0 - 3.2 ratio Final    Comment:                                     LDL/HDL Ratio                                              Men  Women                                1/2 Avg.Risk  1.0    1.5                                    Avg.Risk  3.6    3.2                                 2X Avg.Risk  6.2    5.0                                 3X Avg.Risk  8.0    6.1     • T3, Total 06/08/2022 87  71 - 180 ng/dL Final         Review of Systems   Constitutional: Positive for fatigue.   HENT: Negative.    Respiratory: Negative.    Cardiovascular: Negative.    Gastrointestinal: Negative.    Genitourinary: Negative.    Musculoskeletal: Negative.    Skin: Negative.    Neurological: Negative.    Psychiatric/Behavioral: Positive for sleep disturbance and depressed mood.       Objective   Physical Exam  Constitutional:       Appearance: Normal appearance.   HENT:      Head: Normocephalic.   Cardiovascular:      Rate and Rhythm: Normal rate and regular rhythm.      Pulses: Normal pulses.      Heart sounds: Normal heart sounds.   Pulmonary:      Effort: Pulmonary effort is normal.      Breath  sounds: Normal breath sounds.   Abdominal:      General: Bowel sounds are normal.   Musculoskeletal:         General: Normal range of motion.   Skin:     General: Skin is warm and dry.   Neurological:      General: No focal deficit present.      Mental Status: She is alert and oriented to person, place, and time.   Psychiatric:         Mood and Affect: Mood normal.         Behavior: Behavior normal.         Procedures    Assessment & Plan   Diagnoses and all orders for this visit:    1. Benign essential hypertension (Primary)    2. Other depression    3. Grief reaction    4. Tobacco dependence syndrome          Current Outpatient Medications:   •  albuterol sulfate HFA (ProAir HFA) 108 (90 Base) MCG/ACT inhaler, Inhale 2 puffs Every 4 (Four) Hours As Needed for Shortness of Air., Disp: 19 g, Rfl: 11  •  ALPRAZolam (XANAX) 0.25 MG tablet, Take 1 tablet by mouth 2 (Two) Times a Day As Needed for Anxiety., Disp: 60 tablet, Rfl: 0  •  amLODIPine (NORVASC) 2.5 MG tablet, TAKE 1 TABLET BY MOUTH AT BEDTIME, Disp: 90 tablet, Rfl: 1  •  fluticasone (FLONASE) 50 MCG/ACT nasal spray, SHAKE LIQUID AND USE 2 SPRAYS IN EACH NOSTRIL EVERY DAY AS DIRECTED, Disp: 16 g, Rfl: 6  •  levothyroxine (SYNTHROID, LEVOTHROID) 75 MCG tablet, TAKE 1 TABLET BY MOUTH DAILY ON AN EMPTY STOMACH, Disp: 90 tablet, Rfl: 1  •  pravastatin (PRAVACHOL) 20 MG tablet, TAKE 1 TABLET BY MOUTH DAILY, Disp: 90 tablet, Rfl: 0  •  propranolol (INDERAL) 60 MG tablet, TAKE 1 TABLET BY MOUTH TWICE DAILY (Patient taking differently: Take 60 mg by mouth Daily.), Disp: 60 tablet, Rfl: 2           Evon Luke, APRN 7/19/2022 15:33 EDT  This note has been electronically signed

## 2022-08-22 RX ORDER — PRAVASTATIN SODIUM 20 MG
20 TABLET ORAL DAILY
Qty: 90 TABLET | Refills: 0 | Status: SHIPPED | OUTPATIENT
Start: 2022-08-22 | End: 2022-11-20

## 2022-08-22 RX ORDER — LEVOTHYROXINE SODIUM 0.07 MG/1
75 TABLET ORAL DAILY
Qty: 90 TABLET | Refills: 1 | Status: SHIPPED | OUTPATIENT
Start: 2022-08-22

## 2022-08-22 RX ORDER — PROPRANOLOL HYDROCHLORIDE 60 MG/1
TABLET ORAL
Qty: 90 TABLET | Refills: 1 | Status: SHIPPED | OUTPATIENT
Start: 2022-08-22 | End: 2023-04-05

## 2022-08-22 RX ORDER — PROPRANOLOL HYDROCHLORIDE 60 MG/1
60 TABLET ORAL DAILY
Qty: 180 TABLET | Refills: 1 | Status: SHIPPED | OUTPATIENT
Start: 2022-08-22

## 2022-09-20 RX ORDER — AMLODIPINE BESYLATE 2.5 MG/1
TABLET ORAL
Qty: 90 TABLET | Refills: 1 | Status: SHIPPED | OUTPATIENT
Start: 2022-09-20

## 2022-11-20 RX ORDER — PRAVASTATIN SODIUM 20 MG
20 TABLET ORAL DAILY
Qty: 90 TABLET | Refills: 0 | Status: SHIPPED | OUTPATIENT
Start: 2022-11-20

## 2022-12-19 RX ORDER — FLUTICASONE PROPIONATE 50 MCG
SPRAY, SUSPENSION (ML) NASAL
Qty: 16 G | Refills: 6 | Status: SHIPPED | OUTPATIENT
Start: 2022-12-19

## 2023-02-17 RX ORDER — LEVOTHYROXINE SODIUM 0.07 MG/1
75 TABLET ORAL DAILY
Qty: 90 TABLET | Refills: 1 | OUTPATIENT
Start: 2023-02-17

## 2023-02-17 RX ORDER — PRAVASTATIN SODIUM 20 MG
20 TABLET ORAL DAILY
Qty: 90 TABLET | Refills: 0 | OUTPATIENT
Start: 2023-02-17

## 2023-03-20 RX ORDER — AMLODIPINE BESYLATE 2.5 MG/1
TABLET ORAL
Qty: 90 TABLET | Refills: 1 | OUTPATIENT
Start: 2023-03-20

## 2023-04-05 ENCOUNTER — OFFICE VISIT (OUTPATIENT)
Dept: FAMILY MEDICINE CLINIC | Facility: CLINIC | Age: 48
End: 2023-04-05
Payer: MEDICARE

## 2023-04-05 VITALS
TEMPERATURE: 97.3 F | BODY MASS INDEX: 25.68 KG/M2 | WEIGHT: 127.4 LBS | HEART RATE: 83 BPM | OXYGEN SATURATION: 99 % | DIASTOLIC BLOOD PRESSURE: 78 MMHG | SYSTOLIC BLOOD PRESSURE: 122 MMHG | HEIGHT: 59 IN

## 2023-04-05 DIAGNOSIS — Z83.3 FAMILY HISTORY OF DIABETES MELLITUS: ICD-10-CM

## 2023-04-05 DIAGNOSIS — Z13.220 LIPID SCREENING: ICD-10-CM

## 2023-04-05 DIAGNOSIS — F17.200 TOBACCO DEPENDENCE SYNDROME: ICD-10-CM

## 2023-04-05 DIAGNOSIS — E03.9 HYPOTHYROIDISM, UNSPECIFIED TYPE: Primary | ICD-10-CM

## 2023-04-05 DIAGNOSIS — Z00.00 PREVENTATIVE HEALTH CARE: ICD-10-CM

## 2023-04-05 DIAGNOSIS — R53.83 OTHER FATIGUE: ICD-10-CM

## 2023-04-05 DIAGNOSIS — R25.1 SHAKINESS: ICD-10-CM

## 2023-04-05 RX ORDER — HYDROXYZINE 50 MG/1
50 TABLET, FILM COATED ORAL 3 TIMES DAILY PRN
Qty: 14 TABLET | Refills: 0 | Status: SHIPPED | OUTPATIENT
Start: 2023-04-05

## 2023-04-05 RX ORDER — LEVOTHYROXINE SODIUM 0.07 MG/1
75 TABLET ORAL DAILY
Qty: 90 TABLET | Refills: 1 | Status: CANCELLED | OUTPATIENT
Start: 2023-04-05

## 2023-04-05 NOTE — PATIENT INSTRUCTIONS
Fasting blood work  Try eating something when you have next episode  To be checking blood pressure or heart rate with episodes  Try to eat at least 3 times a day

## 2023-04-05 NOTE — PROGRESS NOTES
Michelle Sandhu is a 48 y.o. female.     History of Present Illness  48-year-old female smoker with depression, chronic back pain, hypothyroidism, GERD, headache, myalgias who comes in today for follow-up visit    Blood pressure 122/78 heart rate 82 with moderate systolic murmur she denies any chest pain, dyspnea, tachycardia or dizziness    Patient states for last couple months she has been having episodes shaking in extremities and head and feeling faint.  She states she also has trouble with memory when this happens.  Patient does have diabetes in the family and I concerned she may be having hypoglycemia since she does not eat very regularly.  I encouraged patient when she has another episode to try eating some peanut butter crackers or something to see if the episode stops which will enable us to rule out diabetes as being the problem.  She denies any tachycardia or irregular heartbeats however I am not sure she would be able to  know if it were happening.  She does do a lot of caffeine    She is also complaining of chronic fatigue and she did have COVID back toward end of the year last year.  She did possibly have long COVID as well.  We will do some fasting labs and patient will let me know if needing something to help with her episodes if not we will look for either cardiac or neuro cause.  Whether there is any history of seizures patient is going to find out or Parkinson's in the family    Weight is down 3 pounds at 127 with a BMI 25.7          Fasting blood work  Try eating something when you have next episode  To be checking blood pressure or heart rate with episodes  Try to eat at least 3 times a day         The following portions of the patient's history were reviewed and updated as appropriate: allergies, current medications, past family history, past medical history, past social history, past surgical history and problem list.    Vitals:    04/05/23 1445   BP: 122/78   BP Location: Left arm   Patient  "Position: Sitting   Cuff Size: Adult   Pulse: 83   Temp: 97.3 °F (36.3 °C)   TempSrc: Oral   SpO2: 99%   Weight: 57.8 kg (127 lb 6.4 oz)   Height: 149.9 cm (59.02\")     Body mass index is 25.72 kg/m².    Past Medical History:   Diagnosis Date   • Fibromyalgia, primary    • Hypertension    • Hypothyroidism    • Rheumatoid arthritis      Past Surgical History:   Procedure Laterality Date   • EYE SURGERY     • EYE SURGERY     • FOOT SURGERY     • INNER EAR SURGERY     • OVARY SURGERY     • SUBTOTAL HYSTERECTOMY     • TUMOR REMOVAL       Family History   Problem Relation Age of Onset   • Hypertension Mother    • COPD Mother    • Heart disease Mother    • Hyperlipidemia Mother    • Diabetes Maternal Uncle    • Cancer Maternal Uncle    • Cancer Maternal Grandfather      Immunization History   Administered Date(s) Administered   • COVID-19 (PFIZER) PURPLE CAP 08/24/2021, 09/14/2021       Orders Only on 06/08/2022   Component Date Value Ref Range Status   • WBC 06/08/2022 7.3  3.4 - 10.8 x10E3/uL Final   • RBC 06/08/2022 4.75  3.77 - 5.28 x10E6/uL Final   • Hemoglobin 06/08/2022 15.7  11.1 - 15.9 g/dL Final   • Hematocrit 06/08/2022 47.1 (H)  34.0 - 46.6 % Final   • MCV 06/08/2022 99 (H)  79 - 97 fL Final   • MCH 06/08/2022 33.1 (H)  26.6 - 33.0 pg Final   • MCHC 06/08/2022 33.3  31.5 - 35.7 g/dL Final   • RDW 06/08/2022 12.1  11.7 - 15.4 % Final   • Platelets 06/08/2022 291  150 - 450 x10E3/uL Final   • Neutrophil Rel % 06/08/2022 43  Not Estab. % Final   • Lymphocyte Rel % 06/08/2022 35  Not Estab. % Final   • Monocyte Rel % 06/08/2022 10  Not Estab. % Final   • Eosinophil Rel % 06/08/2022 11  Not Estab. % Final   • Basophil Rel % 06/08/2022 1  Not Estab. % Final   • Neutrophils Absolute 06/08/2022 3.2  1.4 - 7.0 x10E3/uL Final   • Lymphocytes Absolute 06/08/2022 2.5  0.7 - 3.1 x10E3/uL Final   • Monocytes Absolute 06/08/2022 0.7  0.1 - 0.9 x10E3/uL Final   • Eosinophils Absolute 06/08/2022 0.8 (H)  0.0 - 0.4 x10E3/uL " Final   • Basophils Absolute 06/08/2022 0.1  0.0 - 0.2 x10E3/uL Final   • Immature Granulocyte Rel % 06/08/2022 0  Not Estab. % Final   • Immature Grans Absolute 06/08/2022 0.0  0.0 - 0.1 x10E3/uL Final   • TSH 06/08/2022 2.880  0.450 - 4.500 uIU/mL Final   • Free T4 06/08/2022 1.32  0.82 - 1.77 ng/dL Final   • Glucose 06/08/2022 91  65 - 99 mg/dL Final   • BUN 06/08/2022 8  6 - 24 mg/dL Final   • Creatinine 06/08/2022 0.74  0.57 - 1.00 mg/dL Final   • EGFR Result 06/08/2022 101  >59 mL/min/1.73 Final   • BUN/Creatinine Ratio 06/08/2022 11  9 - 23 Final   • Sodium 06/08/2022 141  134 - 144 mmol/L Final   • Potassium 06/08/2022 4.5  3.5 - 5.2 mmol/L Final   • Chloride 06/08/2022 104  96 - 106 mmol/L Final   • Total CO2 06/08/2022 25  20 - 29 mmol/L Final   • Calcium 06/08/2022 9.3  8.7 - 10.2 mg/dL Final   • Total Protein 06/08/2022 6.6  6.0 - 8.5 g/dL Final   • Albumin 06/08/2022 4.2  3.8 - 4.8 g/dL Final   • Globulin 06/08/2022 2.4  1.5 - 4.5 g/dL Final   • A/G Ratio 06/08/2022 1.8  1.2 - 2.2 Final   • Total Bilirubin 06/08/2022 0.3  0.0 - 1.2 mg/dL Final   • Alkaline Phosphatase 06/08/2022 62  44 - 121 IU/L Final   • AST (SGOT) 06/08/2022 15  0 - 40 IU/L Final   • ALT (SGPT) 06/08/2022 15  0 - 32 IU/L Final   • Total Cholesterol 06/08/2022 246 (H)  100 - 199 mg/dL Final   • Triglycerides 06/08/2022 74  0 - 149 mg/dL Final   • HDL Cholesterol 06/08/2022 59  >39 mg/dL Final   • VLDL Cholesterol Mau 06/08/2022 12  5 - 40 mg/dL Final   • LDL Chol Calc ([facility]) 06/08/2022 175 (H)  0 - 99 mg/dL Final   • LDL/HDL RATIO 06/08/2022 3.0  0.0 - 3.2 ratio Final    Comment:                                     LDL/HDL Ratio                                              Men  Women                                1/2 Avg.Risk  1.0    1.5                                    Avg.Risk  3.6    3.2                                 2X Avg.Risk  6.2    5.0                                 3X Avg.Risk  8.0    6.1     • T3, Total 06/08/2022 87   71 - 180 ng/dL Final         Review of Systems   HENT: Negative.    Respiratory: Negative.    Cardiovascular: Negative.    Gastrointestinal: Negative.    Genitourinary: Negative.    Musculoskeletal: Negative.    Skin: Negative.    Neurological: Positive for tremors and weakness.       Objective   Physical Exam  Constitutional:       Appearance: Normal appearance.   HENT:      Head: Normocephalic.   Cardiovascular:      Rate and Rhythm: Normal rate and regular rhythm.      Pulses: Normal pulses.      Heart sounds: Normal heart sounds.   Pulmonary:      Effort: Pulmonary effort is normal.      Breath sounds: Normal breath sounds.   Abdominal:      General: Bowel sounds are normal.   Musculoskeletal:         General: Normal range of motion.   Skin:     General: Skin is warm and dry.   Neurological:      General: No focal deficit present.      Mental Status: She is alert and oriented to person, place, and time.   Psychiatric:         Mood and Affect: Mood normal.         Behavior: Behavior normal.         Procedures    Assessment & Plan   Diagnoses and all orders for this visit:    1. Hypothyroidism, unspecified type (Primary)  -     TSH+Free T4; Future  -     T3; Future    2. Tobacco dependence syndrome    3. Preventative health care  -     CBC & Differential; Future    4. Lipid screening  -     Lipid Panel With LDL / HDL Ratio; Future    5. Shakiness  -     Comprehensive Metabolic Panel; Future    6. Family history of diabetes mellitus  -     Comprehensive Metabolic Panel; Future  -     Hemoglobin A1c; Future    7. Other fatigue    Other orders  -     hydrOXYzine (ATARAX) 50 MG tablet; Take 1 tablet by mouth 3 (Three) Times a Day As Needed for Itching.  Dispense: 14 tablet; Refill: 0          Current Outpatient Medications:   •  albuterol sulfate HFA (ProAir HFA) 108 (90 Base) MCG/ACT inhaler, Inhale 2 puffs Every 4 (Four) Hours As Needed for Shortness of Air., Disp: 19 g, Rfl: 11  •  amLODIPine (NORVASC) 2.5 MG  tablet, TAKE 1 TABLET BY MOUTH AT BEDTIME, Disp: 90 tablet, Rfl: 1  •  fluticasone (FLONASE) 50 MCG/ACT nasal spray, SHAKE LIQUID AND USE 2 SPRAYS IN EACH NOSTRIL EVERY DAY AS DIRECTED, Disp: 16 g, Rfl: 6  •  levothyroxine (SYNTHROID, LEVOTHROID) 75 MCG tablet, TAKE 1 TABLET BY MOUTH DAILY ON AN EMPTY STOMACH, Disp: 90 tablet, Rfl: 1  •  pravastatin (PRAVACHOL) 20 MG tablet, TAKE 1 TABLET BY MOUTH DAILY, Disp: 90 tablet, Rfl: 0  •  propranolol (INDERAL) 60 MG tablet, Take 1 tablet by mouth Daily., Disp: 180 tablet, Rfl: 1  •  hydrOXYzine (ATARAX) 50 MG tablet, Take 1 tablet by mouth 3 (Three) Times a Day As Needed for Itching., Disp: 14 tablet, Rfl: 0           LAISHA Roe 4/5/2023 16:38 EDT  This note has been electronically signed

## 2023-04-10 ENCOUNTER — OFFICE VISIT (OUTPATIENT)
Dept: FAMILY MEDICINE CLINIC | Facility: CLINIC | Age: 48
End: 2023-04-10
Payer: MEDICARE

## 2023-04-10 VITALS
HEART RATE: 85 BPM | TEMPERATURE: 97.6 F | WEIGHT: 130.6 LBS | BODY MASS INDEX: 26.33 KG/M2 | HEIGHT: 59 IN | DIASTOLIC BLOOD PRESSURE: 84 MMHG | OXYGEN SATURATION: 97 % | SYSTOLIC BLOOD PRESSURE: 133 MMHG

## 2023-04-10 DIAGNOSIS — Z12.4 CERVICAL CANCER SCREENING: Primary | ICD-10-CM

## 2023-04-10 DIAGNOSIS — Z00.00 PREVENTATIVE HEALTH CARE: ICD-10-CM

## 2023-04-10 DIAGNOSIS — Z12.31 OTHER SCREENING MAMMOGRAM: ICD-10-CM

## 2023-04-10 DIAGNOSIS — F17.200 TOBACCO DEPENDENCE SYNDROME: ICD-10-CM

## 2023-04-10 RX ORDER — LEVOTHYROXINE SODIUM 0.07 MG/1
75 TABLET ORAL DAILY
Qty: 90 TABLET | Refills: 1 | Status: SHIPPED | OUTPATIENT
Start: 2023-04-10

## 2023-04-10 NOTE — PROGRESS NOTES
Michelle Sandhu is a 48 y.o. female.     History of Present Illness  48-year-old white female smoker with depression, chronic back pain, hypothyroidism, GERD, headache, myalgias who comes in today for Medicare wellness visit    Blood pressure 122/78 heart rate 82 with S1-S2-S3 she denies any chest pain, dyspnea, tachycardia or dizziness    On last visit 1 week ago patient was having episodes of what she calls tremors and feeling faint.  She also states that she has memory issues during these episodes.  I asked her to eat more frequently since she was going all day without eating on last visit to see if that made a difference in the episodes..  She states she does not think it made a difference.  She did state that she found out they do have a family history of tremors and another neurological disorder but was not Parkinson's but she could not think of the name of it.  We will continue to monitor I encouraged her to try to check heart rate and blood pressure with these episodes    Patient's weight is 131 with a BMI of 26.4.   she has had 2 COVID vaccines but has not been vaccinated for influenza or Pneumovax or shingles.  I am setting her up with an OB/GYN to get her Pap smears and HPV and for a mammogram.  I am also scheduling her mammogram.  She is up-to-date on eye exam.  We will be drawing hepatitis C today          Blood work today  OB/GYN referral  Mammogram  Monitor heart rate and blood pressure with episodes  If episodes do not improve or worsen call office         The following portions of the patient's history were reviewed and updated as appropriate: allergies, current medications, past family history, past medical history, past social history, past surgical history and problem list.    Vitals:    04/10/23 0805   BP: 133/84   BP Location: Right arm   Patient Position: Sitting   Cuff Size: Adult   Pulse: 85   Temp: 97.6 °F (36.4 °C)   TempSrc: Temporal   SpO2: 97%   Weight: 59.2 kg (130 lb 9.6 oz)  "  Height: 149.9 cm (59\")     Body mass index is 26.38 kg/m².    Past Medical History:   Diagnosis Date   • Fibromyalgia, primary    • Hypertension    • Hypothyroidism    • Rheumatoid arthritis      Past Surgical History:   Procedure Laterality Date   • EYE SURGERY     • EYE SURGERY     • FOOT SURGERY     • INNER EAR SURGERY     • OVARY SURGERY     • SUBTOTAL HYSTERECTOMY     • TUMOR REMOVAL       Family History   Problem Relation Age of Onset   • Hypertension Mother    • COPD Mother    • Heart disease Mother    • Hyperlipidemia Mother    • Diabetes Maternal Uncle    • Cancer Maternal Uncle    • Cancer Maternal Grandfather      Immunization History   Administered Date(s) Administered   • COVID-19 (PFIZER) PURPLE CAP 08/24/2021, 09/14/2021       Orders Only on 06/08/2022   Component Date Value Ref Range Status   • WBC 06/08/2022 7.3  3.4 - 10.8 x10E3/uL Final   • RBC 06/08/2022 4.75  3.77 - 5.28 x10E6/uL Final   • Hemoglobin 06/08/2022 15.7  11.1 - 15.9 g/dL Final   • Hematocrit 06/08/2022 47.1 (H)  34.0 - 46.6 % Final   • MCV 06/08/2022 99 (H)  79 - 97 fL Final   • MCH 06/08/2022 33.1 (H)  26.6 - 33.0 pg Final   • MCHC 06/08/2022 33.3  31.5 - 35.7 g/dL Final   • RDW 06/08/2022 12.1  11.7 - 15.4 % Final   • Platelets 06/08/2022 291  150 - 450 x10E3/uL Final   • Neutrophil Rel % 06/08/2022 43  Not Estab. % Final   • Lymphocyte Rel % 06/08/2022 35  Not Estab. % Final   • Monocyte Rel % 06/08/2022 10  Not Estab. % Final   • Eosinophil Rel % 06/08/2022 11  Not Estab. % Final   • Basophil Rel % 06/08/2022 1  Not Estab. % Final   • Neutrophils Absolute 06/08/2022 3.2  1.4 - 7.0 x10E3/uL Final   • Lymphocytes Absolute 06/08/2022 2.5  0.7 - 3.1 x10E3/uL Final   • Monocytes Absolute 06/08/2022 0.7  0.1 - 0.9 x10E3/uL Final   • Eosinophils Absolute 06/08/2022 0.8 (H)  0.0 - 0.4 x10E3/uL Final   • Basophils Absolute 06/08/2022 0.1  0.0 - 0.2 x10E3/uL Final   • Immature Granulocyte Rel % 06/08/2022 0  Not Estab. % Final   • " Immature Grans Absolute 06/08/2022 0.0  0.0 - 0.1 x10E3/uL Final   • TSH 06/08/2022 2.880  0.450 - 4.500 uIU/mL Final   • Free T4 06/08/2022 1.32  0.82 - 1.77 ng/dL Final   • Glucose 06/08/2022 91  65 - 99 mg/dL Final   • BUN 06/08/2022 8  6 - 24 mg/dL Final   • Creatinine 06/08/2022 0.74  0.57 - 1.00 mg/dL Final   • EGFR Result 06/08/2022 101  >59 mL/min/1.73 Final   • BUN/Creatinine Ratio 06/08/2022 11  9 - 23 Final   • Sodium 06/08/2022 141  134 - 144 mmol/L Final   • Potassium 06/08/2022 4.5  3.5 - 5.2 mmol/L Final   • Chloride 06/08/2022 104  96 - 106 mmol/L Final   • Total CO2 06/08/2022 25  20 - 29 mmol/L Final   • Calcium 06/08/2022 9.3  8.7 - 10.2 mg/dL Final   • Total Protein 06/08/2022 6.6  6.0 - 8.5 g/dL Final   • Albumin 06/08/2022 4.2  3.8 - 4.8 g/dL Final   • Globulin 06/08/2022 2.4  1.5 - 4.5 g/dL Final   • A/G Ratio 06/08/2022 1.8  1.2 - 2.2 Final   • Total Bilirubin 06/08/2022 0.3  0.0 - 1.2 mg/dL Final   • Alkaline Phosphatase 06/08/2022 62  44 - 121 IU/L Final   • AST (SGOT) 06/08/2022 15  0 - 40 IU/L Final   • ALT (SGPT) 06/08/2022 15  0 - 32 IU/L Final   • Total Cholesterol 06/08/2022 246 (H)  100 - 199 mg/dL Final   • Triglycerides 06/08/2022 74  0 - 149 mg/dL Final   • HDL Cholesterol 06/08/2022 59  >39 mg/dL Final   • VLDL Cholesterol Mau 06/08/2022 12  5 - 40 mg/dL Final   • LDL Chol Calc ([facility]) 06/08/2022 175 (H)  0 - 99 mg/dL Final   • LDL/HDL RATIO 06/08/2022 3.0  0.0 - 3.2 ratio Final    Comment:                                     LDL/HDL Ratio                                              Men  Women                                1/2 Avg.Risk  1.0    1.5                                    Avg.Risk  3.6    3.2                                 2X Avg.Risk  6.2    5.0                                 3X Avg.Risk  8.0    6.1     • T3, Total 06/08/2022 87  71 - 180 ng/dL Final         Review of Systems   Constitutional: Negative.    HENT: Negative.    Respiratory: Negative.    Cardiovascular:  Negative.    Gastrointestinal: Negative.    Genitourinary: Negative.    Musculoskeletal: Negative.    Skin: Negative.    Neurological: Positive for tremors.   Psychiatric/Behavioral: Negative.        Objective   Physical Exam  Constitutional:       Appearance: Normal appearance.   HENT:      Head: Normocephalic.   Cardiovascular:      Rate and Rhythm: Normal rate and regular rhythm.      Pulses: Normal pulses.      Heart sounds: Normal heart sounds.   Pulmonary:      Effort: Pulmonary effort is normal.      Breath sounds: Normal breath sounds.   Abdominal:      General: Bowel sounds are normal.   Musculoskeletal:         General: Normal range of motion.   Skin:     General: Skin is warm and dry.   Neurological:      General: No focal deficit present.      Mental Status: She is alert and oriented to person, place, and time.      Comments: Episodes of shakiness and memory  problems   Psychiatric:         Mood and Affect: Mood normal.         Procedures    Assessment & Plan   Diagnoses and all orders for this visit:    1. Cervical cancer screening (Primary)  -     Ambulatory Referral to Obstetrics / Gynecology    2. Preventative health care  -     Hepatitis C antibody    3. Tobacco dependence syndrome    4. Other screening mammogram  -     Mammo Screening Digital Tomosynthesis Bilateral With CAD; Future          Current Outpatient Medications:   •  albuterol sulfate HFA (ProAir HFA) 108 (90 Base) MCG/ACT inhaler, Inhale 2 puffs Every 4 (Four) Hours As Needed for Shortness of Air., Disp: 19 g, Rfl: 11  •  amLODIPine (NORVASC) 2.5 MG tablet, TAKE 1 TABLET BY MOUTH AT BEDTIME, Disp: 90 tablet, Rfl: 1  •  fluticasone (FLONASE) 50 MCG/ACT nasal spray, SHAKE LIQUID AND USE 2 SPRAYS IN EACH NOSTRIL EVERY DAY AS DIRECTED, Disp: 16 g, Rfl: 6  •  hydrOXYzine (ATARAX) 50 MG tablet, Take 1 tablet by mouth 3 (Three) Times a Day As Needed for Itching., Disp: 14 tablet, Rfl: 0  •  levothyroxine (SYNTHROID, LEVOTHROID) 75 MCG tablet,  TAKE 1 TABLET BY MOUTH DAILY ON AN EMPTY STOMACH, Disp: 90 tablet, Rfl: 1  •  pravastatin (PRAVACHOL) 20 MG tablet, TAKE 1 TABLET BY MOUTH DAILY, Disp: 90 tablet, Rfl: 0  •  propranolol (INDERAL) 60 MG tablet, Take 1 tablet by mouth Daily., Disp: 180 tablet, Rfl: 1           Evon Luke, LAISHA 4/10/2023 10:20 EDT  This note has been electronically signed

## 2023-04-10 NOTE — PATIENT INSTRUCTIONS
Blood work today  OB/GYN referral  Mammogram  Monitor heart rate and blood pressure with episodes  If episodes do not improve or worsen call office

## 2023-04-11 LAB — HCV IGG SERPL QL IA: NON REACTIVE

## 2023-05-04 RX ORDER — ALBUTEROL SULFATE 90 UG/1
AEROSOL, METERED RESPIRATORY (INHALATION)
Qty: 17 G | Refills: 0 | Status: SHIPPED | OUTPATIENT
Start: 2023-05-04

## 2023-05-16 ENCOUNTER — OFFICE VISIT (OUTPATIENT)
Dept: FAMILY MEDICINE CLINIC | Facility: CLINIC | Age: 48
End: 2023-05-16
Payer: MEDICARE

## 2023-05-16 VITALS
WEIGHT: 124 LBS | SYSTOLIC BLOOD PRESSURE: 148 MMHG | TEMPERATURE: 98 F | BODY MASS INDEX: 25 KG/M2 | HEART RATE: 83 BPM | DIASTOLIC BLOOD PRESSURE: 91 MMHG | OXYGEN SATURATION: 97 % | HEIGHT: 59 IN

## 2023-05-16 DIAGNOSIS — K08.409 HISTORY OF TOOTH EXTRACTION, UNSPECIFIED EDENTULISM CLASS: Primary | ICD-10-CM

## 2023-05-16 DIAGNOSIS — L98.9 SKIN LESION OF NECK: ICD-10-CM

## 2023-05-16 PROCEDURE — 3077F SYST BP >= 140 MM HG: CPT | Performed by: NURSE PRACTITIONER

## 2023-05-16 PROCEDURE — 99214 OFFICE O/P EST MOD 30 MIN: CPT | Performed by: NURSE PRACTITIONER

## 2023-05-16 PROCEDURE — 3080F DIAST BP >= 90 MM HG: CPT | Performed by: NURSE PRACTITIONER

## 2023-05-16 RX ORDER — CLINDAMYCIN HYDROCHLORIDE 300 MG/1
300 CAPSULE ORAL 2 TIMES DAILY
Qty: 20 CAPSULE | Refills: 0 | Status: SHIPPED | OUTPATIENT
Start: 2023-05-16 | End: 2023-05-26

## 2023-05-16 NOTE — PATIENT INSTRUCTIONS
Clindamycin 300 mg twice daily x10 days  Dermatology referral  Schedule mammogram  Make appointment with OB/GYN  Follow-up 3 6 months

## 2023-05-16 NOTE — PROGRESS NOTES
"    Michelle Sandhu is a 48 y.o. female.     History of Present Illness  48-year-old white female smoker with depression, chronic back pain, hypothyroidism, GERD, headache, myalgias who comes in today for Medicare wellness visit    Blood pressure 148/88 heart rate 82 she denies any chest pain, dyspnea, tachycardia or dizziness    Patient recently had all upper teeth pulled in preparation for dentures.  She does have some redness and soreness where front tooth was pulled and I am placing her on an antibiotic.  She has a follow-up appointment with dentist in about 2 weeks    Patient also states she has a lesion on her neck that just popped up about a week ago.  It looks like a skin tag but it is black and came up very quickly.  I am referring her to dermatology    Weight is down 6 pounds at 124 with a BMI 25.  I am assuming this may be due to her teeth being pulled.  She has had 2 COVID vaccines but no other vaccinations.  She is still not made an appointment for her mammogram or her Pap smear          Clindamycin 300 mg twice daily x10 days  Dermatology referral  Schedule mammogram  Make appointment with OB/GYN  Follow-up 3 6 months       The following portions of the patient's history were reviewed and updated as appropriate: allergies, current medications, past family history, past medical history, past social history, past surgical history and problem list.    Vitals:    05/16/23 1426   BP: 148/91   BP Location: Right arm   Patient Position: Sitting   Cuff Size: Adult   Pulse: 83   Temp: 98 °F (36.7 °C)   TempSrc: Temporal   SpO2: 97%   Weight: 56.2 kg (124 lb)   Height: 149.9 cm (59\")     Body mass index is 25.04 kg/m².    Past Medical History:   Diagnosis Date   • Fibromyalgia, primary    • Hypertension    • Hypothyroidism    • Rheumatoid arthritis      Past Surgical History:   Procedure Laterality Date   • EYE SURGERY     • EYE SURGERY     • FOOT SURGERY     • INNER EAR SURGERY     • OVARY SURGERY     • SUBTOTAL " HYSTERECTOMY     • TUMOR REMOVAL       Family History   Problem Relation Age of Onset   • Hypertension Mother    • COPD Mother    • Heart disease Mother    • Hyperlipidemia Mother    • Diabetes Maternal Uncle    • Cancer Maternal Uncle    • Cancer Maternal Grandfather      Immunization History   Administered Date(s) Administered   • COVID-19 (PFIZER) Purple Cap Monovalent 08/24/2021, 09/14/2021       Results Encounter on 04/10/2023   Component Date Value Ref Range Status   • WBC 04/10/2023 7.5  3.4 - 10.8 x10E3/uL Final   • RBC 04/10/2023 4.82  3.77 - 5.28 x10E6/uL Final   • Hemoglobin 04/10/2023 16.1 (H)  11.1 - 15.9 g/dL Final   • Hematocrit 04/10/2023 47.0 (H)  34.0 - 46.6 % Final   • MCV 04/10/2023 98 (H)  79 - 97 fL Final   • MCH 04/10/2023 33.4 (H)  26.6 - 33.0 pg Final   • MCHC 04/10/2023 34.3  31.5 - 35.7 g/dL Final   • RDW 04/10/2023 12.0  11.7 - 15.4 % Final   • Platelets 04/10/2023 253  150 - 450 x10E3/uL Final   • Neutrophil Rel % 04/10/2023 48  Not Estab. % Final   • Lymphocyte Rel % 04/10/2023 30  Not Estab. % Final   • Monocyte Rel % 04/10/2023 10  Not Estab. % Final   • Eosinophil Rel % 04/10/2023 11  Not Estab. % Final   • Basophil Rel % 04/10/2023 1  Not Estab. % Final   • Neutrophils Absolute 04/10/2023 3.6  1.4 - 7.0 x10E3/uL Final   • Lymphocytes Absolute 04/10/2023 2.3  0.7 - 3.1 x10E3/uL Final   • Monocytes Absolute 04/10/2023 0.7  0.1 - 0.9 x10E3/uL Final   • Eosinophils Absolute 04/10/2023 0.8 (H)  0.0 - 0.4 x10E3/uL Final   • Basophils Absolute 04/10/2023 0.1  0.0 - 0.2 x10E3/uL Final   • Immature Granulocyte Rel % 04/10/2023 0  Not Estab. % Final   • Immature Grans Absolute 04/10/2023 0.0  0.0 - 0.1 x10E3/uL Final   • Glucose 04/10/2023 90  70 - 99 mg/dL Final   • BUN 04/10/2023 7  6 - 24 mg/dL Final   • Creatinine 04/10/2023 0.74  0.57 - 1.00 mg/dL Final   • EGFR Result 04/10/2023 100  >59 mL/min/1.73 Final   • BUN/Creatinine Ratio 04/10/2023 9  9 - 23 Final   • Sodium 04/10/2023 141  134  - 144 mmol/L Final   • Potassium 04/10/2023 4.5  3.5 - 5.2 mmol/L Final   • Chloride 04/10/2023 103  96 - 106 mmol/L Final   • Total CO2 04/10/2023 24  20 - 29 mmol/L Final   • Calcium 04/10/2023 9.9  8.7 - 10.2 mg/dL Final   • Total Protein 04/10/2023 6.9  6.0 - 8.5 g/dL Final   • Albumin 04/10/2023 4.5  3.8 - 4.8 g/dL Final   • Globulin 04/10/2023 2.4  1.5 - 4.5 g/dL Final   • A/G Ratio 04/10/2023 1.9  1.2 - 2.2 Final   • Total Bilirubin 04/10/2023 0.5  0.0 - 1.2 mg/dL Final   • Alkaline Phosphatase 04/10/2023 65  44 - 121 IU/L Final   • AST (SGOT) 04/10/2023 15  0 - 40 IU/L Final   • ALT (SGPT) 04/10/2023 9  0 - 32 IU/L Final   • Hemoglobin A1C 04/10/2023 5.2  4.8 - 5.6 % Final    Comment:          Prediabetes: 5.7 - 6.4           Diabetes: >6.4           Glycemic control for adults with diabetes: <7.0     • Total Cholesterol 04/10/2023 252 (H)  100 - 199 mg/dL Final   • Triglycerides 04/10/2023 114  0 - 149 mg/dL Final   • HDL Cholesterol 04/10/2023 59  >39 mg/dL Final   • VLDL Cholesterol Mau 04/10/2023 20  5 - 40 mg/dL Final   • LDL Chol Calc ([facility] [place]) 04/10/2023 173 (H)  0 - 99 mg/dL Final   • LDL/HDL RATIO 04/10/2023 2.9  0.0 - 3.2 ratio Final    Comment:                                     LDL/HDL Ratio                                              Men  Women                                1/2 Avg.Risk  1.0    1.5                                    Avg.Risk  3.6    3.2                                 2X Avg.Risk  6.2    5.0                                 3X Avg.Risk  8.0    6.1     • TSH 04/10/2023 1.200  0.450 - 4.500 uIU/mL Final   • Free T4 04/10/2023 1.50  0.82 - 1.77 ng/dL Final   • T3, Total 04/10/2023 106  71 - 180 ng/dL Final         Review of Systems   Constitutional: Negative.    HENT:        Gum pain from tooth extraction   Respiratory: Negative.    Cardiovascular: Negative.    Gastrointestinal: Negative.    Genitourinary: Negative.    Musculoskeletal: Negative.    Skin: Positive for skin lesions.    Neurological: Negative.    Psychiatric/Behavioral: Negative.        Objective   Physical Exam  Constitutional:       Appearance: Normal appearance.   HENT:      Head: Normocephalic.      Mouth/Throat:      Comments: Upper teeth removed gum infection present  Cardiovascular:      Rate and Rhythm: Normal rate and regular rhythm.      Pulses: Normal pulses.      Heart sounds: Normal heart sounds.   Pulmonary:      Effort: Pulmonary effort is normal.      Breath sounds: Normal breath sounds.   Abdominal:      General: Bowel sounds are normal.   Musculoskeletal:         General: Normal range of motion.   Skin:     General: Skin is warm and dry.      Comments: Lesion that looks like a skin tag but is black and came up very quickly   Neurological:      General: No focal deficit present.      Mental Status: She is alert and oriented to person, place, and time.   Psychiatric:         Mood and Affect: Mood normal.         Behavior: Behavior normal.         Procedures    Assessment & Plan   Diagnoses and all orders for this visit:    1. History of tooth extraction, unspecified edentulism class (Primary)    2. Skin lesion of neck    Other orders  -     clindamycin (CLEOCIN) 300 MG capsule; Take 1 capsule by mouth 2 (Two) Times a Day for 10 days.  Dispense: 20 capsule; Refill: 0          Current Outpatient Medications:   •  albuterol sulfate  (90 Base) MCG/ACT inhaler, INHALE 2 PUFFS INTO THE LUNGS EVERY 4 HOURS AS NEEDED SHORTNESS OF AIR, Disp: 17 g, Rfl: 0  •  amLODIPine (NORVASC) 2.5 MG tablet, TAKE 1 TABLET BY MOUTH AT BEDTIME, Disp: 90 tablet, Rfl: 1  •  fluticasone (FLONASE) 50 MCG/ACT nasal spray, SHAKE LIQUID AND USE 2 SPRAYS IN EACH NOSTRIL EVERY DAY AS DIRECTED, Disp: 16 g, Rfl: 6  •  hydrOXYzine (ATARAX) 50 MG tablet, Take 1 tablet by mouth 3 (Three) Times a Day As Needed for Itching., Disp: 14 tablet, Rfl: 0  •  levothyroxine (SYNTHROID, LEVOTHROID) 75 MCG tablet, TAKE 1 TABLET BY MOUTH DAILY ON AN EMPTY  STOMACH, Disp: 90 tablet, Rfl: 1  •  pravastatin (PRAVACHOL) 20 MG tablet, TAKE 1 TABLET BY MOUTH DAILY, Disp: 90 tablet, Rfl: 0  •  propranolol (INDERAL) 60 MG tablet, Take 1 tablet by mouth Daily., Disp: 180 tablet, Rfl: 1  •  clindamycin (CLEOCIN) 300 MG capsule, Take 1 capsule by mouth 2 (Two) Times a Day for 10 days., Disp: 20 capsule, Rfl: 0           Evon Luke, LAISHA 5/16/2023 15:28 EDT  This note has been electronically signed

## 2023-08-16 RX ORDER — LEVOTHYROXINE SODIUM 0.07 MG/1
75 TABLET ORAL DAILY
Qty: 90 TABLET | Refills: 0 | Status: SHIPPED | OUTPATIENT
Start: 2023-08-16